# Patient Record
Sex: MALE | Race: WHITE | Employment: OTHER | URBAN - METROPOLITAN AREA
[De-identification: names, ages, dates, MRNs, and addresses within clinical notes are randomized per-mention and may not be internally consistent; named-entity substitution may affect disease eponyms.]

---

## 2020-04-03 ENCOUNTER — NURSE TRIAGE (OUTPATIENT)
Dept: NURSING | Facility: CLINIC | Age: 40
End: 2020-04-03

## 2020-04-03 ENCOUNTER — VIRTUAL VISIT (OUTPATIENT)
Dept: FAMILY MEDICINE | Facility: OTHER | Age: 40
End: 2020-04-03

## 2020-04-03 NOTE — TELEPHONE ENCOUNTER
Patient calling because he needs a note from his provider about when he should return to work.      His symptoms are improving, does not wish to triage symptoms at this time.    Directed patient to [email or web address]    Protocol and care advice reviewed  Caller states understanding of the recommended disposition  Advised to call back with further questions or concerns    Salome Borden RN        Reason for Disposition    General information question, no triage required and triager able to answer question    COVID-19 Home Isolation, questions about    Additional Information    Negative: [1] Caller is not with the adult (patient) AND [2] reporting urgent symptoms    Negative: Lab result questions    Negative: Medication questions    Negative: Caller can't be reached by phone    Negative: Caller has already spoken to PCP or another triager    Negative: RN needs further essential information from caller in order to complete triage    Negative: Requesting regular office appointment    Negative: [1] Caller requesting NON-URGENT health information AND [2] PCP's office is the best resource    Negative: Health Information question, no triage required and triager able to answer question    Negative: SEVERE difficulty breathing (e.g., struggling for each breath, speaks in single words)    Negative: Difficult to awaken or acting confused (e.g., disoriented, slurred speech)    Negative: Bluish (or gray) lips or face now    Negative: Shock suspected (e.g., cold/pale/clammy skin, too weak to stand, low BP, rapid pulse)    Negative: Sounds like a life-threatening emergency to the triager    Negative: [1] COVID-19 suspected (e.g., cough, fever, shortness of breath) AND [2] public health department recommends testing    Negative: [1] COVID-19 exposure AND [2] no symptoms    Negative: COVID-19 and Breastfeeding, questions about    Negative: SEVERE or constant chest pain (Exception: mild central chest pain, present only when coughing)    Negative:  MODERATE difficulty breathing (e.g., speaks in phrases, SOB even at rest, pulse 100-120)    Negative: Patient sounds very sick or weak to the triager    Negative: MILD difficulty breathing (e.g., minimal/no SOB at rest, SOB with walking, pulse <100)    Negative: Chest pain    Negative: Fever > 103 F (39.4 C)    Negative: [1] Fever > 101 F (38.3 C) AND [2] age > 60    Negative: [1] Fever > 100.0 F (37.8 C) AND [2] bedridden (e.g., nursing home patient, CVA, chronic illness, recovering from surgery)    Negative: HIGH RISK patient (e.g., age > 64 years, diabetes, heart or lung disease, weak immune system)    Negative: Fever present > 3 days (72 hours)    Negative: [1] Fever returns after gone for over 24 hours AND [2] symptoms worse or not improved    Negative: [1] Continuous (nonstop) coughing interferes with work or school AND [2] no improvement using cough treatment per protocol    Negative: Cough present > 3 weeks    Negative: 1] COVID-19 infection diagnosed or suspected AND [2] mild symptoms (fever, cough) AND [2] no trouble breathing or other complications    Negative: COVID-19, questions about    [facility] Specific Disposition  - REQUIRED: [facility] Specific Patient Instructions  COVID 19 Nurse Triage Plan/Patient Instructions    Please be aware that novel coronavirus (COVID-19) may be circulating in the community. If you develop symptoms such as fever, cough, or SOB or if you have concerns about the presence of another infection including coronavirus (COVID-19), please contact your health care provider or visit [email or web address].       Additional COVID19 information to add for patients.     Additional General Information About COVID-19    COVID-19 - General Information:  Regardless of if you have been tested or not:  Patient who have symptoms (cough, fever, or shortness of breath), need to isolate for 7 days from when symptoms started AND 72 hours after fever resolves (without fever reducing  medications) AND improvement of respiratory symptoms (whichever is longer).    Isolate yourself at home (in own room/own bathroom if possible)  Do Not allow any visitors  Do Not go to work or school  Do Not go to Y[employer],  centers, shopping, or other public places.  Do Not shake hands.  Avoid close and intimate contact with others (hugging, kissing).  Follow CDC recommendations for household cleaning of frequently touched services.     After the initial 7 days, continue to isolate yourself from household members as much as possible. To continue decrease the risk of community spread and exposure, you and any members of your household should limit activities in public for 14 days after starting home isolation.     You can reference the following CDC link for helpful home isolation/care tips:  https://www.cdc.gov/coronavirus/2019-ncov/downloads/10Things.pdf    COVID-19 - Symptoms:   The COVID-19 can cause a respiratory illness, such as bronchitis or pneumonia.  The most common symptoms are: cough, fever, and shortness of breath.   Other symptoms are: body aches, chills, diarrhea, fatigue, headache, runny nose, and sore throat     COVID-19 - Exposure Risk Factors:  Exposure to a person who has been diagnosed with COVID-19 .  Travel from an area with recent local transmission of COVID-19 .  The CDC (www.cdc.gov) has the most up-to-date list of where the COVID-19 outbreak is occurring.    COVID-19 - Spreading:   The virus likely spreads through respiratory droplets produced when a person coughs or sneezes. These respiratory droplets can travel approximately 6 feet and can remain on surfaces.  Common disinfectants will kill the virus.  The CDC currently does not recommend healthy people wearing masks.    COVID-19 - Protect Yourself:   Avoid close contact with people known to have this new COVID-19 infection.  Wash hands often with soap and water or alcohol-based hand .  Avoid touching the eyes, nose or  mouth.     Thank you for limiting contact with others, wearing a simple mask to cover your cough, practice good hand hygiene habits and accessing our virtual services where possible to limit the spread of this virus.    For more information about COVID19 and options for caring for yourself at home, please visit the CDC website at https://www.cdc.gov/coronavirus/2019-ncov/about/steps-when-sick.html  For more options for care at [facility], please visit our website at [email or web address]    For more information, please use the [facility] ([facility]) COVID-19 Hotlines (Interpreters available):   Health questions: Phone Number: [phone] or [phone] and Hours: 7 a.m. to 7 p.m.  Schools and  questions: Phone Number: [phone] or [phone] and Hours 7 a.m. to 7 p.m.    Protocols used: INFORMATION ONLY CALL-A-AH, CORONAVIRUS (COVID-19) DIAGNOSED OR CADFPKHTE-C-UC 3.30.20

## 2020-04-03 NOTE — PROGRESS NOTES
"Date: 2020 15:58:53  Clinician: Kristen Feliz  Clinician NPI: [id number]  Patient: ANIL CHAU  Patient : 1980  Patient Address: [facility] [address], [place] [address]  Patient Phone: [phone]  Visit Protocol: URI  Patient Summary:  ANIL is a 39 year old ( : 1980 ) male who initiated a Visit for COVID-19 (Coronavirus) evaluation and screening. When asked the question \"Please sign me up to receive news, health information and promotions from [employer].\", ANIL responded \"Yes\".    ANIL states his symptoms started gradually 7-9 days ago.   His symptoms consist of myalgia, chills, a cough, malaise, and rhinitis.   Symptom details     Nasal secretions: The color of his mucus is white and clear.    Cough: ANIL coughs a few times an hour and his cough is not more bothersome at night. Phlegm comes into his throat when he coughs. He does not believe his cough is caused by post-nasal drip. The color of the phlegm is white and clear.      ANIL denies having teeth pain, headache, fever, facial pain or pressure, wheezing, sore throat, nasal congestion, and ear pain. He also denies double sickening (worsening symptoms after initial improvement), taking antibiotic medication for the symptoms, and having recent facial or sinus surgery in the past 60 days. He is not experiencing dyspnea.   Precipitating events  He has not recently been exposed to someone with influenza. ANIL has not been in close contact with any high risk individuals.   Pertinent COVID-19 (Coronavirus) information  ANIL has not traveled internationally or to the areas where COVID-19 (Coronavirus) is widespread, including cruise ship travel in the last 14 days before the start of his symptoms.   ANIL has not had a close contact with a laboratory-confirmed COVID-19 patient within 14 days of symptom onset. He also has not had a close contact with a suspected COVID-19 patient within 14 days of symptom onset.   ANIL is not a healthcare worker or a "  and does not work in a healthcare facility. He does not live with a healthcare worker.   Pertinent medical history  ANIL needs a return to work/school note.   Weight: 200 lbs   ANIL does not smoke or use smokeless tobacco.   Weight: 200 lbs    MEDICATIONS: famotidine-Ca carb-mag hydrox oral, ALLERGIES: pantoprazole, esomeprazole, omeprazole  Clinician Response:  Dear ANIL,   Based on the information you have provided, you do have symptoms that are consistent with Coronavirus (COVID-19).   The coronavirus causes mild to severe respiratory illness with the most common symptoms including fever, cough and difficulty breathing. Unfortunately, many viruses cause similar symptoms and it can be difficult to distinguish between viruses, especially in mild cases, so we are presuming that anyone with cough or fever has coronavirus at this time.  Coronavirus/COVID-19 has reached the point of community spread in [place], meaning that we are finding the virus in people with no known exposure risk for marcelino the virus. Given the increasing commonness of coronavirus in the community we are no longer testing patients who are not critically ill.  If you are a health care worker, you should refer to your employee health office for instructions about testing and returning to work.  For everyone else who has cough or fever, you should assume you are infected with coronavirus. Since you will not be tested but have symptoms that may be consistent with coronavirus, the CDC recommends you stay in self-isolation until these three things have happened:    You have had no fever for at least 72 hours (that is three full days of no fever without the use of medicine that reduces fevers)    AND   Other symptoms have improved (for example, when your cough or shortness of breath have improved)   AND   At least 7 days have passed since your symptoms first appeared.   How to Isolate:    Isolate yourself at home.   Do Not allow  any visitors  Do Not go to work or school  Do Not go to S[employer],  centers, shopping, or other public places.  Do Not shake hands.  Avoid close contact with others (hugging, kissing).   Protect Others:    Cover Your Mouth and Nose with a mask, disposable tissue or wash cloth to avoid spreading germs to others.  Wash your hands and face frequently with soap and water.   Managing Symptoms:    At this time, we primarily recommend Tylenol (Acetaminophen) for fever or pain. If you have liver or kidney problems, contact your primary care provider for instructions on use of tylenol. Adults can take 650 mg (two 325 mg pills) by mouth every 4-6 hours as needed OR 1,000 mg (two 500 mg pills) every 8 hours as needed. MAXIMUM DAILY DOSE: 3,000mg. For children, refer to dosing on bottle based on age or weight.   If you develop significant shortness of breath that prevents you from doing normal activities, please call 911 or proceed to the nearest emergency room and alert them immediately that you have been in self-isolation for possible coronavirus.   If you have a higher risk medical condition such as cancer, heart failure, end stage renal disease on dialysis or have a transplant, please reach out to your specialist's clinic to advise them of your [facility] visit should you not improve within the next two days.  For more information about COVID19 and options for caring for yourself at home, please visit the CDC website at https://www.cdc.gov/coronavirus/2019-ncov/about/steps-when-sick.htmlFor more options for care at [facility], please visit our website at [email or web address]     Diagnosis: Coronavirus infection, unspecified  Diagnosis ICD: B34.2  Prescription: benzonatate 200 mg oral capsule 3 capsule, 0 days supply. Take 1 capsule by mouth 3 times per day as needed for cough. Refills: 0, Refill as needed: no, Allow substitutions: yes  Prescription: fluticasone propionate (Flonase  Allergy Relief) 50 mcg/actuation nasal spray,suspension 1 60 spray aerosol with adapter, 0 days supply. Inhale 2 sprays in each nostril intranasally 1 time per day as needed. Refills: 0, Refill as needed: no, Allow substitutions: yes  Addendum created: September 24 19:40:24, 2020 created by: Gina Reid MD body: Dear Prosper, your [facility] visit was dated April. I cannot unfortunately release you to work based on a visit done in April. Please contact your primary care provider or schedule a virtual appointment with your primary care provider to discuss

## 2020-09-29 ENCOUNTER — VIRTUAL VISIT (OUTPATIENT)
Dept: FAMILY MEDICINE | Facility: OTHER | Age: 40
End: 2020-09-29
Payer: COMMERCIAL

## 2020-09-29 PROCEDURE — 99421 OL DIG E/M SVC 5-10 MIN: CPT | Performed by: FAMILY MEDICINE

## 2020-09-30 NOTE — PROGRESS NOTES
"Date: 2020 20:11:32  Clinician: Leonardo Floyd  Clinician NPI: [id number]  Patient: ANIL CHAU  Patient : 1980  Patient Address: [facility] [address], [place] [address]  Patient Phone: [phone]  Visit Protocol: URI  Patient Summary:  ANIL is a 40 year old ( : 1980 ) male who initiated a [facility] Visit for COVID-19 (Coronavirus) evaluation and screening. When asked the question \"Please sign me up to receive news, health information and promotions from [facility].\", ANIL responded \"No\".    When asked when his symptoms started, ANIL reported that he does not have any symptoms.   He denies taking antibiotic medication in the past month and having recent facial or sinus surgery in the past 60 days.    Pertinent COVID-19 (Coronavirus) information  In the past 14 days, ANIL has not worked in a congregate living setting.   He does not work or volunteer as healthcare worker or a  and does not work or volunteer in a healthcare facility.   ANIL also has not lived in a congregate living setting in the past 14 days. He does not live with a healthcare worker.   ANIL has not had a close contact with a laboratory-confirmed COVID-19 patient within the last 14 days.   Since 2019, ANIL and has not had upper respiratory infection or influenza-like illness. Has not been diagnosed with lab-confirmed COVID-19 test   Pertinent medical history  ANIL needs a return to work/school note.   Weight: 200 lbs   ANIL does not smoke or use smokeless tobacco.   Weight: 200 lbs    MEDICATIONS: famotidine-Ca carb-mag hydrox oral, ALLERGIES: omeprazole, esomeprazole, pantoprazole  Clinician Response:  Dear ANIL,   Based on your exposure to COVID-19 (coronavirus), we would like to test you for this virus.  1. Please call [phone] to schedule your visit. Explain that you were referred by [facility] to have a COVID-19 test. Be ready to share your [facility] visit ID number.  The following will serve as your written " order for this COVID Test, ordered by me, for the indication of suspected COVID [Z20.828]: The test will be ordered in [employer], our electronic health record, after you are scheduled. It will show as ordered and authorized by Quinn Butterfield MD.  Order: COVID-19 (coronavirus) PCR [facility]  If you know you have had close contact with someone who tested positive, you should be quarantined for 14 days after this exposure. You should stay in quarantine for the14 days even if the covid test is negative, the optimal time to test after exposure is 5-7 days from the exposure  Quarantine means   What should I do?  For safety, it's very important to follow these rules. Do this for 14 days after the date you were last exposed to the virus..  Stay home and away from others. Don't go to school or anywhere else. Generally quarantine means staying home from work but there are some exceptions to this. Please contact your workplace.   No hugging, kissing or shaking hands.  Don't let anyone visit.  Cover your mouth and nose with a mask, tissue or washcloth to avoid spreading germs.  Wash your hands and face often. Use soap and water.  What are the symptoms of COVID-19?  The most common symptoms are cough, fever and trouble breathing. Less common symptoms include headache, body aches, fatigue (feeling very tired), chills, sore throat, stuffy or runny nose, diarrhea (loose poop), loss of taste or smell, belly pain, and nausea or vomiting (feeling sick to your stomach or throwing up).  After 14 days, if you have still don't have symptoms, you likely don't have this virus.  If you develop symptoms, follow these guidelines.  If you're normally healthy: Please start another [facility] visit to report your symptoms. Go to [email or web address].  If you have a serious health problem (like cancer, heart failure, an organ transplant or kidney disease): Call your specialty clinic. Let them know that you might have COVID-19.  2.  When it's time for your COVID test:  Stay at least 6 feet away from others. (If someone will drive you to your test, stay in the backseat, as far away from the  as you can.)  Cover your mouth and nose with a mask, tissue or washcloth.  Go straight to the testing site. Don't make any stops on the way there or back.  Please note  Caregivers in these groups are at risk for severe illness due to COVID-19:  o People 65 years and older  o People who live in a nursing home or long-term care facility  o People with chronic disease (lung, heart, cancer, diabetes, kidney, liver, immunologic)  o People who have a weakened immune system, including those who:  Are in cancer treatment  Take medicine that weakens the immune system, such as corticosteroids  Had a bone marrow or organ transplant  Have an immune deficiency  Have poorly controlled HIV or AIDS  Are obese (body mass index of 40 or higher)  Smoke regularly  Where can I get more information?  [facility] -- About COVID-19: [email or web address]  CDC -- What to Do If You're Sick: www.cdc.gov/coronavirus/2019-ncov/about/steps-when-sick.html  CDC -- Ending Home Isolation: www.cdc.gov/coronavirus/2019-ncov/hcp/disposition-in-home-patients.html  [facility] -- Caring for Someone: www.cdc.gov/coronavirus/2019-ncov/if-you-are-sick/care-for-someone.html  [facility] -- Interim Guidance for Hospital Discharge to Home: www.health.[facility].[place]/diseases/coronavirus/hcp/hospdischarge.pdf  [facility] clinical trials (COVID-19 research studies): clinicalaffairs.[facility]-clinical-trials  Below are the COVID-19 hotlines at the [facility] of Health ([facility]). Interpreters are available.  For health questions: Call 630-461-9503 or [phone] (7 a.m. to 7 p.m.)  For questions about schools and childcare: Call 384-216-4509 or [phone] (7 a.m. to 7 p.m.)    Diagnosis: Contact with and (suspected) exposure to other viral communicable diseases  Diagnosis  ICD: Z20.828

## 2020-10-13 DIAGNOSIS — Z20.822 SUSPECTED 2019 NOVEL CORONAVIRUS INFECTION: Primary | ICD-10-CM

## 2020-11-23 ENCOUNTER — APPOINTMENT (OUTPATIENT)
Dept: GENERAL RADIOLOGY | Facility: CLINIC | Age: 40
End: 2020-11-23
Attending: EMERGENCY MEDICINE
Payer: COMMERCIAL

## 2020-11-23 ENCOUNTER — NURSE TRIAGE (OUTPATIENT)
Dept: NURSING | Facility: CLINIC | Age: 40
End: 2020-11-23

## 2020-11-23 ENCOUNTER — HOSPITAL ENCOUNTER (EMERGENCY)
Facility: CLINIC | Age: 40
Discharge: HOME | End: 2020-11-23
Attending: EMERGENCY MEDICINE | Admitting: EMERGENCY MEDICINE
Payer: COMMERCIAL

## 2020-11-23 VITALS
DIASTOLIC BLOOD PRESSURE: 88 MMHG | TEMPERATURE: 98 F | SYSTOLIC BLOOD PRESSURE: 136 MMHG | WEIGHT: 205 LBS | HEART RATE: 82 BPM | BODY MASS INDEX: 26.31 KG/M2 | RESPIRATION RATE: 8 BRPM | HEIGHT: 74 IN | OXYGEN SATURATION: 100 %

## 2020-11-23 DIAGNOSIS — Z87.19 HISTORY OF GASTROESOPHAGEAL REFLUX (GERD): ICD-10-CM

## 2020-11-23 DIAGNOSIS — R07.9 CHEST PAIN, UNSPECIFIED TYPE: ICD-10-CM

## 2020-11-23 LAB
ALBUMIN SERPL-MCNC: 4.1 G/DL (ref 3.4–5)
ALP SERPL-CCNC: 92 U/L (ref 40–150)
ALT SERPL W P-5'-P-CCNC: 28 U/L (ref 0–70)
ANION GAP SERPL CALCULATED.3IONS-SCNC: 5 MMOL/L (ref 3–14)
AST SERPL W P-5'-P-CCNC: 15 U/L (ref 0–45)
BASOPHILS # BLD AUTO: 0 10E9/L (ref 0–0.2)
BASOPHILS NFR BLD AUTO: 0.4 %
BILIRUB SERPL-MCNC: 0.3 MG/DL (ref 0.2–1.3)
BUN SERPL-MCNC: 10 MG/DL (ref 7–30)
CALCIUM SERPL-MCNC: 9.2 MG/DL (ref 8.5–10.1)
CHLORIDE SERPL-SCNC: 104 MMOL/L (ref 94–109)
CO2 SERPL-SCNC: 28 MMOL/L (ref 20–32)
CREAT SERPL-MCNC: 0.97 MG/DL (ref 0.66–1.25)
D DIMER PPP FEU-MCNC: <0.3 UG/ML FEU (ref 0–0.5)
DIFFERENTIAL METHOD BLD: ABNORMAL
EOSINOPHIL # BLD AUTO: 0 10E9/L (ref 0–0.7)
EOSINOPHIL NFR BLD AUTO: 0.3 %
ERYTHROCYTE [DISTWIDTH] IN BLOOD BY AUTOMATED COUNT: 12.1 % (ref 10–15)
GFR SERPL CREATININE-BSD FRML MDRD: >90 ML/MIN/{1.73_M2}
GLUCOSE SERPL-MCNC: 106 MG/DL (ref 70–99)
HCT VFR BLD AUTO: 46.5 % (ref 40–53)
HGB BLD-MCNC: 16.6 G/DL (ref 13.3–17.7)
IMM GRANULOCYTES # BLD: 0 10E9/L (ref 0–0.4)
IMM GRANULOCYTES NFR BLD: 0.3 %
LYMPHOCYTES # BLD AUTO: 1.4 10E9/L (ref 0.8–5.3)
LYMPHOCYTES NFR BLD AUTO: 12.9 %
MCH RBC QN AUTO: 31.4 PG (ref 26.5–33)
MCHC RBC AUTO-ENTMCNC: 35.7 G/DL (ref 31.5–36.5)
MCV RBC AUTO: 88 FL (ref 78–100)
MONOCYTES # BLD AUTO: 0.4 10E9/L (ref 0–1.3)
MONOCYTES NFR BLD AUTO: 4 %
NEUTROPHILS # BLD AUTO: 8.9 10E9/L (ref 1.6–8.3)
NEUTROPHILS NFR BLD AUTO: 82.1 %
NRBC # BLD AUTO: 0 10*3/UL
NRBC BLD AUTO-RTO: 0 /100
PLATELET # BLD AUTO: 228 10E9/L (ref 150–450)
POTASSIUM SERPL-SCNC: 3.6 MMOL/L (ref 3.4–5.3)
PROT SERPL-MCNC: 7.7 G/DL (ref 6.8–8.8)
RBC # BLD AUTO: 5.29 10E12/L (ref 4.4–5.9)
SODIUM SERPL-SCNC: 137 MMOL/L (ref 133–144)
TROPONIN I SERPL-MCNC: <0.015 UG/L (ref 0–0.04)
WBC # BLD AUTO: 10.8 10E9/L (ref 4–11)

## 2020-11-23 PROCEDURE — 93005 ELECTROCARDIOGRAM TRACING: CPT | Performed by: EMERGENCY MEDICINE

## 2020-11-23 PROCEDURE — 93010 ELECTROCARDIOGRAM REPORT: CPT | Performed by: EMERGENCY MEDICINE

## 2020-11-23 PROCEDURE — 99285 EMERGENCY DEPT VISIT HI MDM: CPT | Mod: 25 | Performed by: EMERGENCY MEDICINE

## 2020-11-23 PROCEDURE — 85379 FIBRIN DEGRADATION QUANT: CPT | Performed by: EMERGENCY MEDICINE

## 2020-11-23 PROCEDURE — 71046 X-RAY EXAM CHEST 2 VIEWS: CPT

## 2020-11-23 PROCEDURE — 80053 COMPREHEN METABOLIC PANEL: CPT | Performed by: EMERGENCY MEDICINE

## 2020-11-23 PROCEDURE — 85025 COMPLETE CBC W/AUTO DIFF WBC: CPT | Performed by: EMERGENCY MEDICINE

## 2020-11-23 PROCEDURE — 84484 ASSAY OF TROPONIN QUANT: CPT | Performed by: EMERGENCY MEDICINE

## 2020-11-23 RX ORDER — SUCRALFATE ORAL 1 G/10ML
1 SUSPENSION ORAL 2 TIMES DAILY PRN
Qty: 70 ML | Refills: 0 | Status: SHIPPED | OUTPATIENT
Start: 2020-11-23 | End: 2020-11-30

## 2020-11-23 ASSESSMENT — ENCOUNTER SYMPTOMS
PSYCHIATRIC NEGATIVE: 1
CONSTITUTIONAL NEGATIVE: 1
CHEST TIGHTNESS: 1
NEUROLOGICAL NEGATIVE: 1
GASTROINTESTINAL NEGATIVE: 1
ALLERGIC/IMMUNOLOGIC NEGATIVE: 1
EYES NEGATIVE: 1
HEMATOLOGIC/LYMPHATIC NEGATIVE: 1
MUSCULOSKELETAL NEGATIVE: 1
ENDOCRINE NEGATIVE: 1

## 2020-11-23 ASSESSMENT — MIFFLIN-ST. JEOR: SCORE: 1899.62

## 2020-11-23 NOTE — LETTER
January 9, 2023      Prosper Ponce  [address] [place] [address]        Dear ,    We are writing to inform you of your test results.    H. Pylori testing is negative.    Resulted Orders   Helicobacter pylori Antigen Stool   Result Value Ref Range    Helicobacter pylori Antigen Stool Negative Negative      Comment:      Negative for Helicobacter pylori antigen by enzyme immunoassay. A negative result indicates the absence of H. pylori antigen or that the level of antigen is below the level of detection.       If you have any questions or concerns, please call the clinic at the number listed above.       Sincerely,      Mai Huerta NP

## 2020-11-23 NOTE — ED AVS SNAPSHOT
[facility] Emergency Dept  [address] [address]  Phone: [phone]  Fax: [phone]                                    Prosper Ponce   MRN: [id number]    Department: [facility] Emergency Dept   Date of Visit: 11/23/2020           After Visit Summary Signature Page    I have received my discharge instructions, and my questions have been answered. I have discussed any challenges I see with this plan with the nurse or doctor.    ..........................................................................................................................................  Patient/Patient Representative Signature      ..........................................................................................................................................  Patient Representative Print Name and Relationship to Patient    ..................................................               ................................................  Date                                   Time    ..........................................................................................................................................  Reviewed by Signature/Title    ...................................................              ..............................................  Date                                               Time          22EPIC Rev 08/18

## 2020-11-23 NOTE — TELEPHONE ENCOUNTER
Pt reports he developed chest tightness and L arm pain on Friday.  The pain seems to be less intense now but still present.  He states a dentist once told him he had high BP.  He denies other heart history diagnosis.      Disposition:  ED now.  He verbalized understanding and had no further questions.     COVID 19 Nurse Triage Plan/Patient Instructions    Please be aware that novel coronavirus (COVID-19) may be circulating in the community. If you develop symptoms such as fever, cough, or SOB or if you have concerns about the presence of another infection including coronavirus (COVID-19), please contact your health care provider or visit [email or web address].     Disposition/Instructions    ED Visit recommended. Follow protocol based instructions.     Bring Your Own Device:  Please also bring your smart device(s) (smart phones, tablets, laptops) and their charging cables for your personal use and to communicate with your care team during your visit.    Thank you for taking steps to prevent the spread of this virus.  o Limit your contact with others.  o Wear a simple mask to cover your cough.  o Wash your hands well and often.    Resources    [facility]: About COVID-19: [email or web address]    CDC: What to Do If You're Sick: www.cdc.gov/coronavirus/2019-ncov/about/steps-when-sick.html    CDC: Ending Home Isolation: www.cdc.gov/coronavirus/2019-ncov/hcp/disposition-in-home-patients.html     CDC: Caring for Someone: www.cdc.gov/coronavirus/2019-ncov/if-you-are-sick/care-for-someone.html     [facility]: Interim Guidance for Hospital Discharge to Home: www.health.[email or web address]/diseases/coronavirus/hcp/hospdischarge.pdf    [facility] clinical trials (COVID-19 research studies): [email or web address]     Below are the COVID-19 hotlines at the [facility] ([facility]). Interpreters are available.   o For health questions: Call 697-419-1395 or 1-985.533.8252 (7 a.m. to 7  "p.m.)  o For questions about schools and childcare: Call 355-959-1061 or 1-275.268.1849 (7 a.m. to 7 p.m.)         Blanche Blum RN/ANGELLA      Reason for Disposition    [1] Age > 40 AND [2] associated chest or jaw pain AND [3] pain lasts > 5 minutes    Additional Information    Negative: Severe difficulty breathing (e.g., struggling for each breath, speaks in single words)    Negative: Difficult to awaken or acting confused (e.g., disoriented, slurred speech)    Negative: Shock suspected (e.g., cold/pale/clammy skin, too weak to stand, low BP, rapid pulse)    Negative: [1] Chest pain lasts > 5 minutes AND [2] history of heart disease  (i.e., heart attack, bypass surgery, angina, angioplasty, CHF; not just a heart murmur)    Negative: [1] Chest pain lasts > 5 minutes AND [2] described as crushing, pressure-like, or heavy    Negative: [1] Chest pain lasts > 5 minutes AND [2] age > 50    Negative: [1] Chest pain lasts > 5 minutes AND [2] age > 30 AND [3] at least one cardiac risk factor (i.e., hypertension, diabetes, obesity, smoker or strong family history of heart disease)    Negative: [1] Chest pain lasts > 5 minutes AND [2] not relieved with nitroglycerin    Negative: Passed out (i.e., lost consciousness, collapsed and was not responding)    Negative: Heart beating < 50 beats per minute OR > 140 beats per minute    Negative: Visible sweat on face or sweat dripping down face    Negative: Sounds like a life-threatening emergency to the triager    Negative: SEVERE chest pain    Negative: [1] Intermittent  chest pain or \"angina\" AND [2] increasing in severity or frequency  (Exception: pains lasting a few seconds)    Negative: Shock suspected (e.g., cold/pale/clammy skin, too weak to stand, low BP, rapid pulse)    Negative: [1] Similar pain previously AND [2] it was from \"heart attack\"    Negative: [1] Similar pain previously AND [2] it was from \"angina\" AND [3] not relieved by nitroglycerin    Negative: Sounds like a " life-threatening emergency to the triager    Negative: Difficulty breathing or unusual sweating (e.g., sweating without exertion)    Protocols used: ARM PAIN-A-AH, CHEST PAIN-A-AH

## 2020-11-24 NOTE — ED NOTES
Pt c/o left upper chest pain radiating into shoulder and down arm since Thursday. Pt states pain has generally gotten better since then but not gone, chest pain has lessened in severity, still present and still waxes/wanes. Pt taking PPI thinking it was heartburn, no change in s/s. Pt denies SOB, had mild nausea none now.

## 2020-11-24 NOTE — ED PROVIDER NOTES
History     Chief Complaint   Patient presents with     Chest Pain     cp and lt arm pain     HPI  Prosper Ponce is a 40 year old male who presents with report of chest pain and left arm pain.  Patient arrived by car alone from home in [facility] reporting  intermittent left-sided chest discomfort and left arm pain over the last few days.  Patient reports a history of GERD-currently on famotidine. Patient reports no personal history of heart disease. Patient reports paternal relative who  of myocardial infarction in his 60s.  Patient reports he does not smoke.  He reports no personal history of DVT or PE.  No fever or chills.  Patient lives alone at home and reports no contact with anyone with COVID-19 symptoms.  No lower extremity swelling.  No calf pain. Patient  reports no dyspnea on exertion.  Patient reports  he noted a plaque-like lesion about the left hip.  He reports a personal history of psoriasis and family history of psoriasis.  Lesion about the left hip is similar to prior lesions he has had in the past with flare of dry skin, and psoriasis. 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  No palpitations.  Patient was not certain if his pain was related to acid reflux and wanted to come in because he had discomfort about the left chest and left arm and wanted to be evaluated.    Allergies:  No Known Allergies    Problem List:    There are no active problems to display for this patient.       Past Medical History:    No past medical history on file.    Past Surgical History:    No past surgical history on file.    Family History:    No family history on file.    Social History:  Marital Status:  Single [1]  Social History     Tobacco Use     Smoking status: Not on file   Substance Use Topics     Alcohol use: Not on file     Drug use: Not on file        Medications:         sucralfate (CARAFATE) 1 GM/10ML suspension          Review of Systems   Constitutional: Negative.   "  HENT: Negative.    Eyes: Negative.    Respiratory: Positive for chest tightness.    Cardiovascular: Positive for chest pain.   Gastrointestinal: Negative.    Endocrine: Negative.    Genitourinary: Negative.    Musculoskeletal: Negative.    Skin: Negative.    Allergic/Immunologic: Negative.    Neurological: Negative.    Hematological: Negative.    Psychiatric/Behavioral: Negative.    All other systems reviewed and are negative.      Physical Exam   BP: 125/89  Pulse: 100  Temp: 98  F (36.7  C)  Resp: 18  Height: 188 cm (6' 2\")  Weight: 93 kg (205 lb)  SpO2: 95 %      Physical Exam  Constitutional:       General: He is not in acute distress.     Appearance: He is not ill-appearing, toxic-appearing or diaphoretic.   HENT:      Head: Normocephalic and atraumatic.   Eyes:      Extraocular Movements: Extraocular movements intact.      Pupils: Pupils are equal, round, and reactive to light.   Neck:      Musculoskeletal: Normal range of motion and neck supple.      Thyroid: No thyromegaly.      Vascular: No hepatojugular reflux or JVD.      Trachea: No tracheal deviation.   Cardiovascular:      Rate and Rhythm: Normal rate and regular rhythm.      Heart sounds: Normal heart sounds.   Pulmonary:      Effort: Pulmonary effort is normal. No tachypnea, accessory muscle usage or respiratory distress.      Breath sounds: Normal breath sounds.   Chest:      Chest wall: No mass, deformity, tenderness, crepitus or edema. There is no dullness to percussion.   Abdominal:      General: There is no abdominal bruit.      Palpations: There is no hepatomegaly, splenomegaly or mass.      Tenderness: There is no abdominal tenderness. There is no guarding or rebound.   Musculoskeletal:      Right lower leg: He exhibits no tenderness. No edema.      Left lower leg: He exhibits no tenderness. No edema.   Lymphadenopathy:      Cervical: No cervical adenopathy.   Skin:     Capillary Refill: Capillary refill takes less than 2 seconds.      " Coloration: Skin is not cyanotic or pale.      Findings: No ecchymosis, erythema or rash.      Nails: There is no clubbing.     Neurological:      General: No focal deficit present.      Mental Status: He is alert and oriented to person, place, and time.      Cranial Nerves: No cranial nerve deficit.      Motor: No weakness.   Psychiatric:         Mood and Affect: Mood normal. Mood is not anxious.         Behavior: Behavior normal. Behavior is not agitated.         ED Course        Procedures               EKG Interpretation:      Interpreted by Bogdan Mcneil MD  Time reviewed: 2000  Symptoms at time of EKG: None   Rhythm: normal sinus   Rate: Normal  Axis: Normal  Ectopy: none  Conduction: normal  ST Segments/ T Waves: Non-specific ST-T wave changes  Q Waves: nonspecific  Comparison to prior: Unchanged from November 23, 2020    Clinical Impression: no acute changes          Critical Care time:  none               ED medications: none      ED vitals:  Vitals:    11/23/20 1842 11/23/20 1900 11/23/20 1930 11/23/20 2000   BP: (!) 142/92 138/87 133/85 136/88   Pulse: 76 77 78 82   Resp: 18 20 13 8   Temp:       TempSrc:       SpO2: 99% 98% 97% 100%   Weight:       Height:             ED labs and imaging:  Results for orders placed or performed during the hospital encounter of 11/23/20   Chest XR,  PA & LAT     Status: None    Narrative    CHEST TWO VIEWS  11/23/2020 8:20 PM     HISTORY:  Chest pain. Left arm pain.    COMPARISON: 10/14/2010.      Impression    IMPRESSION: Negative chest. Lungs clear.    BRUNILDA MALLOY MD   CBC with platelets differential     Status: Abnormal   Result Value Ref Range    WBC 10.8 4.0 - 11.0 10e9/L    RBC Count 5.29 4.4 - 5.9 10e12/L    Hemoglobin 16.6 13.3 - 17.7 g/dL    Hematocrit 46.5 40.0 - 53.0 %    MCV 88 78 - 100 fl    MCH 31.4 26.5 - 33.0 pg    MCHC 35.7 31.5 - 36.5 g/dL    RDW 12.1 10.0 - 15.0 %    Platelet Count 228 150 - 450 10e9/L    Diff Method Automated Method     %  Neutrophils 82.1 %    % Lymphocytes 12.9 %    % Monocytes 4.0 %    % Eosinophils 0.3 %    % Basophils 0.4 %    % Immature Granulocytes 0.3 %    Nucleated RBCs 0 0 /100    Absolute Neutrophil 8.9 (H) 1.6 - 8.3 10e9/L    Absolute Lymphocytes 1.4 0.8 - 5.3 10e9/L    Absolute Monocytes 0.4 0.0 - 1.3 10e9/L    Absolute Eosinophils 0.0 0.0 - 0.7 10e9/L    Absolute Basophils 0.0 0.0 - 0.2 10e9/L    Abs Immature Granulocytes 0.0 0 - 0.4 10e9/L    Absolute Nucleated RBC 0.0    Comprehensive metabolic panel     Status: Abnormal   Result Value Ref Range    Sodium 137 133 - 144 mmol/L    Potassium 3.6 3.4 - 5.3 mmol/L    Chloride 104 94 - 109 mmol/L    Carbon Dioxide 28 20 - 32 mmol/L    Anion Gap 5 3 - 14 mmol/L    Glucose 106 (H) 70 - 99 mg/dL    Urea Nitrogen 10 7 - 30 mg/dL    Creatinine 0.97 0.66 - 1.25 mg/dL    GFR Estimate >90 >60 mL/min/[1.73_m2]    GFR Estimate If Black >90 >60 mL/min/[1.73_m2]    Calcium 9.2 8.5 - 10.1 mg/dL    Bilirubin Total 0.3 0.2 - 1.3 mg/dL    Albumin 4.1 3.4 - 5.0 g/dL    Protein Total 7.7 6.8 - 8.8 g/dL    Alkaline Phosphatase 92 40 - 150 U/L    ALT 28 0 - 70 U/L    AST 15 0 - 45 U/L   Troponin I     Status: None   Result Value Ref Range    Troponin I ES <0.015 0.000 - 0.045 ug/L   D dimer quantitative     Status: None   Result Value Ref Range    D Dimer <0.3 0.0 - 0.50 ug/ml FEU           Assessments & Plan (with Medical Decision Making)   Assessment Summary and Clinical Impression: 40-year-old male who presented with report of chest pain and left arm pain.  Patient reported pain has been intermittent. Patient was concerned pain was related to acid reflux, not improved with taking famotidine daily and trial of Tums with prior use of Prilosec.  He reports he had an upper endoscopy about 5 years ago that was unremarkable-records not viewable during ED course.  Patient reports he was concerned that his pain was radiating to his left arm and wanted to come in to be evaluated.  He remained  hemodynamically stable during his ED course and was otherwise symptom-free and expressed comfort going home with plan to trial Carafate for symptom management in addition to famotidine and close outpatient follow-up.  Patient is encouraged to establish primary care for reevaluation of his symptoms within 1 week and then to follow-up upper endoscopy may be warranted to exclude any esophagitis versus other process.    ED course and Plan:  Reviewed the medical record.  EKG on arrival today shows no acute ischemic change from comparison from October 14, 2020.  A broad differential was considered.  Reviewed possible causes of symptoms including but not limited to  acute coronary syndrome, GERD, pneumothorax, pulmonary embolism, esophagitis, gastritis.  Patient remained hemodynamic stable during his ED course.  His work-up was reassuring including negative troponin and negative D-dimer.  Chest imaging was reviewed independently and the radiologist  interpretation was also reviewed.  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  We discussed and reviewed reasons to return to the department to be reevaluated including but not limited to palpitation, progressive symptoms despite therapeutic measures reviewed.  We discussed it was prudent for him to establish primary care to help with possible referral to surgery clinical gastroenterology if follow-up endoscopy would be medically warranted depending on his symptom evolution. Patient expressed comfort, understanding and agreement with plan of care.      Disclaimer: This note consists of symbols derived from keyboarding, dictation and/or voice recognition software. As a result, there may be errors in the script that have gone undetected. Please consider this when interpreting information found in this chart.  I have reviewed the  nursing notes.    I have reviewed the findings, diagnosis, plan and need for follow up with the patient.       Discharge Medication List as of 11/23/2020  8:56 PM      START taking these medications    Details   sucralfate (CARAFATE) 1 GM/10ML suspension Take 10 mLs (1 g) by mouth 2 times daily as needed (GERD), Disp-70 mL, R-0, Local Print             Final diagnoses:   Chest pain, unspecified type - Uncertain cause,   History of gastroesophageal reflux (GERD)       11/23/2020   [facility] EMERGENCY DEPT     Bogdan Mcneil MD  11/24/20 0139

## 2020-11-24 NOTE — DISCHARGE INSTRUCTIONS
1) Your evaluation today does not suggest an emergency diagnosis.  The cause of your chest discomfort and left arm discomfort as described and reported is not clear.  We have agreed that you are stable for discharge to home with work-up not reveal any acute process.    2) For symptoms of reflux we discussed ongoing use of famotidine 40 mg/day and consideration of use of Carafate-as prescribed.    3) we discussed a follow-up upper endoscopy may be necessary symptoms persist to exclude esophagitis and/or ulcer.  May call the surgery clinic to schedule follow-up visit however referral may be necessary.  We have discussed the importance of calling the primary care clinic to both establish primary care and to help with referral if necessary for additional diagnostic testing.    4) although you appear stable for discharge to home at this time if your symptoms worsen including but not limited to chest pain and shortness of breath, fainting, fever, cough or new concerns you should return immediately to be reevaluated

## 2020-11-30 ENCOUNTER — OFFICE VISIT (OUTPATIENT)
Dept: FAMILY MEDICINE | Facility: CLINIC | Age: 40
End: 2020-11-30
Payer: COMMERCIAL

## 2020-11-30 VITALS
HEART RATE: 86 BPM | RESPIRATION RATE: 12 BRPM | SYSTOLIC BLOOD PRESSURE: 112 MMHG | TEMPERATURE: 97.7 F | DIASTOLIC BLOOD PRESSURE: 78 MMHG | BODY MASS INDEX: 24.37 KG/M2 | WEIGHT: 189.8 LBS | OXYGEN SATURATION: 97 %

## 2020-11-30 DIAGNOSIS — L20.9 ATOPIC DERMATITIS, UNSPECIFIED TYPE: ICD-10-CM

## 2020-11-30 DIAGNOSIS — K21.9 GASTROESOPHAGEAL REFLUX DISEASE, UNSPECIFIED WHETHER ESOPHAGITIS PRESENT: Primary | ICD-10-CM

## 2020-11-30 PROBLEM — L85.3 DRY SKIN: Status: RESOLVED | Noted: 2020-11-30 | Resolved: 2020-11-30

## 2020-11-30 PROBLEM — L85.3 DRY SKIN: Status: ACTIVE | Noted: 2020-11-30

## 2020-11-30 PROCEDURE — 99203 OFFICE O/P NEW LOW 30 MIN: CPT | Performed by: NURSE PRACTITIONER

## 2020-11-30 RX ORDER — SUCRALFATE ORAL 1 G/10ML
1 SUSPENSION ORAL 4 TIMES DAILY
Qty: 420 ML | Refills: 3 | Status: SHIPPED | OUTPATIENT
Start: 2020-11-30 | End: 2022-12-23

## 2020-11-30 NOTE — NURSING NOTE
"Initial /78   Pulse 86   Temp 97.7  F (36.5  C) (Tympanic)   Resp 12   Wt 86.1 kg (189 lb 12.8 oz)   SpO2 97%   BMI 24.37 kg/m   Estimated body mass index is 24.37 kg/m  as calculated from the following:    Height as of 11/23/20: 1.88 m (6' 2\").    Weight as of this encounter: 86.1 kg (189 lb 12.8 oz). .      "

## 2020-11-30 NOTE — PROGRESS NOTES
"Subjective     Prosper Ponce is a 40 year old male who presents to clinic today for the following health issues:    HPI         ED/UC Followup:    Facility:  [facility] ER  Date of visit: 11/23/20  Reason for visit: Chest pain  Current Status: States that he started taking Nexium daily, states that med started some lower abdominal pain for the first day, no issues since. Has also started taking BGL/liquiorice/slippery elm/ for ulcer type symptoms. States that left shoulder /chest/arm pain has dissipated. States some occasional left lower rib pain continues, but severity has decreased. Has been told that he has GERD a while ago, after endoscopy. Heart burn is a common problem for him. Famotidine and carafate was not helpful for symptoms. Wondering if he has H-pilori.     \"Assessment Summary and Clinical Impression: 40-year-old male who presented with report of chest pain and left arm pain.  Patient reported pain has been intermittent. Patient was concerned pain was related to acid reflux, not improved with taking famotidine daily and trial of Tums with prior use of Prilosec.  He reports he had an upper endoscopy about 5 years ago that was unremarkable-records not viewable during ED course.  Patient reports he was concerned that his pain was radiating to his left arm and wanted to come in to be evaluated.  He remained hemodynamically stable during his ED course and was otherwise symptom-free and expressed comfort going home with plan to trial Carafate for symptom management in addition to famotidine and close outpatient follow-up.  Patient is encouraged to establish primary care for reevaluation of his symptoms within 1 week and then to follow-up upper endoscopy may be warranted to exclude any esophagitis versus other process.     ED course and Plan:  Reviewed the medical record.  EKG on arrival today shows no acute ischemic change from comparison from October 14, 2020.  A broad differential was " "considered.  Reviewed possible causes of symptoms including but not limited to  acute coronary syndrome, GERD, pneumothorax, pulmonary embolism, esophagitis, gastritis.  Patient remained hemodynamic stable during his ED course.  His work-up was reassuring including negative troponin and negative D-dimer.  Chest imaging was reviewed independently and the radiologist  interpretation was also reviewed.  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  We discussed and reviewed reasons to return to the department to be reevaluated including but not limited to palpitation, progressive symptoms despite therapeutic measures reviewed.  We discussed it was prudent for him to establish primary care to help with possible referral to surgery clinical gastroenterology if follow-up endoscopy would be medically warranted depending on his symptom evolution. Patient expressed comfort, understanding and agreement with plan of care.\"      Additional provider notes: Arm and upper chest pain is improved. Left rib cage pain improved since starting Nexium. Hx of using Protonix, pepcid, omeprazole. Now taking Nexium with full resolution of sternal pain. Hx hiatal hernia. Had endoscopy ~6 years ago. Drinks a lot of caffeine, has decreased that over the past week.     Rash: dry patch to skin    Review of Systems   Constitutional: Negative.    Cardiovascular: Negative.    Gastrointestinal: Positive for heartburn. Negative for abdominal pain, diarrhea, nausea and vomiting.   Skin: Positive for rash.            Objective    /78   Pulse 86   Temp 97.7  F (36.5  C) (Tympanic)   Resp 12   Wt 86.1 kg (189 lb 12.8 oz)   SpO2 97%   BMI 24.37 kg/m    Body mass index is 24.37 kg/m .  Physical Exam  Vitals signs and nursing note reviewed.   Constitutional:       General: He is not in acute distress.    " " Appearance: Normal appearance. He is not ill-appearing or toxic-appearing.   Cardiovascular:      Rate and Rhythm: Normal rate and regular rhythm.      Pulses: Normal pulses.      Heart sounds: Normal heart sounds.   Pulmonary:      Effort: Pulmonary effort is normal.      Breath sounds: Normal breath sounds.   Skin:     General: Skin is warm and dry.      Comments: Dry patch of skin consistent with eczema   Neurological:      Mental Status: He is alert and oriented to person, place, and time.   Psychiatric:         Behavior: Behavior normal.                    Assessment & Plan     Gastroesophageal reflux disease, unspecified whether esophagitis present  Possible ulcer. Continue Nexium. Start carafate. Side effects, risks and benefits of medication were discussed with patient. Discussed how and when to take medication. Patient would like to avoid another endoscopy at this time. Patient requesting to try medication first, then will call to schedule if symptoms not improve.     Also recommended patient make appt to establish care.     - sucralfate (CARAFATE) 1 GM/10ML suspension; Take 10 mLs (1 g) by mouth 4 times daily    Atopic dermatitis, unspecified type  Recommended Aquaphor. Follow-up if symptoms do not improve.        BMI:   Estimated body mass index is 24.37 kg/m  as calculated from the following:    Height as of 11/23/20: 1.88 m (6' 2\").    Weight as of this encounter: 86.1 kg (189 lb 12.8 oz).            Patient Instructions   Continue Nexium daily.     Restart Carafate. Follow instructions. Take 4-8 weeks.     Follow-up if symptoms continue as you will likely need endoscopy.     Make appointment to establish care with a provider.     Patient Education     Tips to Control Acid Reflux    To control acid reflux, you ll need to make some basic diet and lifestyle changes. The simple steps outlined below may be all you ll need to ease discomfort.   Watch what you eat    Don't have fatty foods or spicy " foods.    Eat fewer acidic foods, such as citrus and tomato-based foods. These can increase symptoms.    Limit drinking alcohol, caffeine, and fizzy beverages. All increase acid reflux.    Try limiting chocolate, peppermint, and spearmint. These can make acid reflux worse in some people.    Watch when you eat    Don't lie down for 3 hours after eating.    Don't snack before going to bed.    Raise your head  Raising your head and upper body by 4 to 6 inches helps limit reflux when you re lying down. Put blocks under the head of your bed frame or a wedge under your mattress to raise it.   Other changes    Lose weight, if you need to    Don t exercise near bedtime    Don't wear tight-fitting clothes    Limit aspirin and ibuprofen    Stop smoking    [employer] last reviewed this educational content on 6/1/2019 2000-2020 [employer] [address] [place] [address], [place] [address]. All rights reserved. This information is not intended as a substitute for professional medical care. Always follow your healthcare professional's instructions.               Return if symptoms worsen or fail to improve.    ARSH Wong [facility]

## 2020-11-30 NOTE — PATIENT INSTRUCTIONS
Continue Nexium daily.     Restart Carafate. Follow instructions. Take 4-8 weeks.     Follow-up if symptoms continue as you will likely need endoscopy.     Make appointment to establish care with a provider.     Patient Education     Tips to Control Acid Reflux    To control acid reflux, you ll need to make some basic diet and lifestyle changes. The simple steps outlined below may be all you ll need to ease discomfort.   Watch what you eat    Don't have fatty foods or spicy foods.    Eat fewer acidic foods, such as citrus and tomato-based foods. These can increase symptoms.    Limit drinking alcohol, caffeine, and fizzy beverages. All increase acid reflux.    Try limiting chocolate, peppermint, and spearmint. These can make acid reflux worse in some people.    Watch when you eat    Don't lie down for 3 hours after eating.    Don't snack before going to bed.    Raise your head  Raising your head and upper body by 4 to 6 inches helps limit reflux when you re lying down. Put blocks under the head of your bed frame or a wedge under your mattress to raise it.   Other changes    Lose weight, if you need to    Don t exercise near bedtime    Don't wear tight-fitting clothes    Limit aspirin and ibuprofen    Stop smoking    [employer] last reviewed this educational content on 6/1/2019 2000-2020 [employer]. [address] [place] [address], [place] [address]. All rights reserved. This information is not intended as a substitute for professional medical care. Always follow your healthcare professional's instructions.

## 2020-12-01 ASSESSMENT — ENCOUNTER SYMPTOMS
NAUSEA: 0
ABDOMINAL PAIN: 0
VOMITING: 0
DIARRHEA: 0
HEARTBURN: 1
CONSTITUTIONAL NEGATIVE: 1
CARDIOVASCULAR NEGATIVE: 1

## 2021-01-15 ENCOUNTER — HEALTH MAINTENANCE LETTER (OUTPATIENT)
Age: 41
End: 2021-01-15

## 2021-01-24 ENCOUNTER — NURSE TRIAGE (OUTPATIENT)
Dept: NURSING | Facility: CLINIC | Age: 41
End: 2021-01-24

## 2021-01-24 NOTE — TELEPHONE ENCOUNTER
"Left sided pain (above bottom rib - mostly in front) - 50 days ago was seen in ED for LUE/shoulder pain - was diagnosed with heartburn. Is taking Nexium and Carafate occasionally. Rates pain 2-3/10.     Per protocol advised UC evaluation - patient declines - wants to be seen in clinic. Warm transferred to scheduling line.      Carmen Boone RN on 1/24/2021 at 5:00 PM    Additional Information    Negative: Shock suspected (e.g., cold/pale/clammy skin, too weak to stand, low BP, rapid pulse)    Negative: Difficult to awaken or acting confused (e.g., disoriented, slurred speech)    Negative: Passed out (i.e., lost consciousness, collapsed and was not responding)    Negative: Sounds like a life-threatening emergency to the triager    Pain is mainly in upper abdomen  (if needed ask: \"is it mainly above the belly button?\")    Negative: Severe difficulty breathing (e.g., struggling for each breath, speaks in single words)    Negative: Shock suspected (e.g., cold/pale/clammy skin, too weak to stand, low BP, rapid pulse)    Negative: Difficult to awaken or acting confused (e.g., disoriented, slurred speech)    Negative: Passed out (i.e., lost consciousness, collapsed and was not responding)    Negative: Visible sweat on face or sweat dripping down face    Negative: Sounds like a life-threatening emergency to the triager    Negative: Followed an abdomen (stomach) injury    Negative: Chest pain    Negative: [1] SEVERE pain (e.g., excruciating) AND [2] present > 1 hour    Negative: [1] Pain lasts > 10 minutes AND [2] age > 50    Negative: [1] Pain lasts > 10 minutes AND [2] age > 40 AND [3] associated chest, arm, neck, upper back or jaw pain    Negative: [1] Pain lasts > 10 minutes AND [2] age > 35 AND [3] at least one cardiac risk factor (i.e., hypertension, diabetes, obesity, smoker or strong family history of heart disease)    Negative: [1] Pain lasts > 10 minutes AND [2] history of heart disease (i.e., heart attack, bypass " "surgery, angina, angioplasty, CHF; not just a heart murmur)    Negative: [1] Pain lasts > 10 minutes AND [2] difficulty breathing    Negative: [1] Vomiting AND [2] contains red blood  (Exception: few streaks and only occurred once)    Negative: [1] Vomiting AND [2] contains black (\"coffee ground\") material    Negative: Blood in bowel movements  (Exception: Blood on surface of BM with constipation)    Negative: Black or tarry bowel movements  (Exception: chronic-unchanged  black-grey bowel movements AND is taking iron pills or Pepto-bismol)    Negative: [1] Pregnant > 24 weeks AND [2] hand or face swelling    Negative: Patient sounds very sick or weak to the triager    [1] MILD-MODERATE pain AND [2] constant AND [3] present > 2 hours    Protocols used: ABDOMINAL PAIN - MALE-A-AH, ABDOMINAL PAIN - UPPER-A-AH      "

## 2021-01-25 ENCOUNTER — OFFICE VISIT (OUTPATIENT)
Dept: FAMILY MEDICINE | Facility: CLINIC | Age: 41
End: 2021-01-25
Payer: COMMERCIAL

## 2021-01-25 VITALS
HEIGHT: 73 IN | BODY MASS INDEX: 25.05 KG/M2 | WEIGHT: 189 LBS | SYSTOLIC BLOOD PRESSURE: 110 MMHG | OXYGEN SATURATION: 97 % | TEMPERATURE: 96.1 F | HEART RATE: 77 BPM | DIASTOLIC BLOOD PRESSURE: 80 MMHG

## 2021-01-25 DIAGNOSIS — R19.8 CHANGE IN BOWEL MOVEMENT: ICD-10-CM

## 2021-01-25 DIAGNOSIS — R10.12 LUQ ABDOMINAL PAIN: Primary | ICD-10-CM

## 2021-01-25 PROCEDURE — 99214 OFFICE O/P EST MOD 30 MIN: CPT | Performed by: FAMILY MEDICINE

## 2021-01-25 ASSESSMENT — MIFFLIN-ST. JEOR: SCORE: 1807.21

## 2021-01-25 NOTE — PATIENT INSTRUCTIONS
Schedule upper endoscopy and colonoscopy.  Further recommendation to be given then.    Continue esomeprazole for now  Avoid possible triggers for heartburn and abdominal symptoms.      Patient Education     Abdominal Pain  Abdominal pain is pain in the stomach or belly area. Everyone has this pain from time to time. In many cases it goes away on its own. But abdominal pain can sometimes be due to a serious problem, such as appendicitis. So it s important to know when to get help.     Causes of abdominal pain   There are many possible causes of abdominal pain. Common causes in adults include:    Constipation, diarrhea, or gas    Stomach acid flowing back up into the esophagus (acid reflux or heartburn)    Severe acid reflux, called GERD (gastroesophageal reflux disease)    A sore in the lining of the stomach or small intestine (peptic ulcer)    Inflammation of the gallbladder, liver, or pancreas    Gallstones or kidney stones    Appendicitis     Intestinal blockage     An internal organ pushing through a muscle or other tissue (hernia)    Urinary tract infections    In women, menstrual cramps, fibroids, ovarian cysts, pelvic inflammatory disease, or endometriosis    Inflammation or infection of the intestines, including Crohn's disease and ulcerative colitis    Irritable bowel syndrome  Diagnosing the cause of abdominal pain   Your healthcare provider will give you a physical exam help find the cause of your pain. If needed, you will have tests. Belly pain has many possible causes. So it can be hard to find the reason for your pain. Giving details about your pain can help. Tell your provider where and when you feel the pain, and what makes it better or worse. Also let your provider know if you have other symptoms such as:     Fever    Tiredness    Upset stomach (nausea)    Vomiting    Changes in bathroom habits    Blood in the stool or black, tarry stool    Weight loss that you can't explain (involuntary weight  loss?)  Also report any family history of stomach or intestinal problems, or cancers. Tell your provider about all your alcohol use and drug use. Tell your provider about all medicines you use, including herbs, vitamins, and supplements.   Treating abdominal pain   Some causes of pain need emergency medical treatment right away. These include appendicitis or a bowel blockage. Other problems can be treated with rest, fluids, or medicines. Your healthcare provider can give you specific instructions for treatment or self-care based on what is causing your pain.      If you have vomiting or diarrhea, sip water or other clear fluids. When you are ready to eat solid foods again, start with small amounts of easy-to-digest, low-fat foods. These include apple sauce, toast, or crackers.   When to get medical care   Call 911 or go to the hospital right away if you:     Can t pass stool and are vomiting    Are vomiting blood or have bloody diarrhea or black, tarry diarrhea    Have chest, neck, or shoulder pain    Feel like you might pass out    Have pain in your shoulder blades with nausea    Have sudden, severe belly pain    Have new, severe pain unlike any you have felt before    Have a belly that is rigid, hard, and hurts to touch  Call your healthcare provider if you have:     Pain for more than 5 days    Bloating for more than 2 days    Diarrhea for more than 5 days    A fever of 100.4 F (38 C) or higher, or as directed by your healthcare provider    Pain that gets worse    Weight loss for no reason    Continued lack of appetite    Blood in your stool  How to prevent abdominal pain   Here are some tips to help prevent abdominal pain:     Eat smaller amounts of food at each meal.    Don't eat greasy, fried, or other high-fat foods.    Don't eat foods that give you gas.    Exercise regularly.    Drink plenty of fluids.  To help prevent GERD symptoms:     Quit smoking.    Reduce alcohol and foods that increase stomach  acid.    Don't use aspirin or over-the-counter pain and fever medicines, if possible. This includes nonsteroidal anti-inflammatory drugs (NSAIDs).    Lose excess weight.    Finish eating at least 2 hours before you go to bed or lie down.    Raise the head of your bed.  [employer] last reviewed this educational content on 4/1/2019 2000-2020 [employer]. [address] [place] [address]. All rights reserved. This information is not intended as a substitute for professional medical care. Always follow your healthcare professional's instructions.           Patient Education     Tips to Control Acid Reflux    To control acid reflux, you ll need to make some basic diet and lifestyle changes. The simple steps outlined below may be all you ll need to ease discomfort.   Watch what you eat    Don't have fatty foods or spicy foods.    Eat fewer acidic foods, such as citrus and tomato-based foods. These can increase symptoms.    Limit drinking alcohol, caffeine, and fizzy beverages. All increase acid reflux.    Try limiting chocolate, peppermint, and spearmint. These can make acid reflux worse in some people.    Watch when you eat    Don't lie down for 3 hours after eating.    Don't snack before going to bed.    Raise your head  Raising your head and upper body by 4 to 6 inches helps limit reflux when you re lying down. Put blocks under the head of your bed frame or a wedge under your mattress to raise it.   Other changes    Lose weight, if you need to    Don t exercise near bedtime    Don't wear tight-fitting clothes    Limit aspirin and ibuprofen    Stop smoking    [employer] last reviewed this educational content on 6/1/2019 2000-2020 [employer]. [address] [place] [address]. All rights reserved. This information is not intended as a substitute for professional medical care. Always follow your healthcare professional's instructions.

## 2021-01-25 NOTE — PROGRESS NOTES
Assessment & Plan     LUQ abdominal pain  Unclear etiology of chronic abdominal pain.  DDx: gastritis, peptic ulcer disease, muscular pain, colitis, increased stool burden, occult intraabdominal pathology.  Reviewed previous endoscopy report in 2016 in [facility]. No acute findings then but with small hiatal hernia found.  Advised repeating endoscopy and adding colonoscopy in case there is colonic involvement  Considering he has BM changes too.  Continue PPI. May hold sucralfate.  Advised prevention of acid reflux.  GI consult depending on results or if with still unexplained symptoms.  Return precautions discussed and given to patient.  - GASTROENTEROLOGY ADULT REF PROCEDURE ONLY    Change in bowel movement  Patient reports alternating diarrhea and normal BMs.  DDx: IBS, IBD, other colitis, paradoxical diarrhea.  No previous scopes or imaging available for review in [facility].  Patient concurred to pursue colonoscopy.  Further recommendations when results are out.  - GASTROENTEROLOGY ADULT REF PROCEDURE ONLY      Review of external notes as documented above   Review of prior external note(s) from - [facility] information from [facility]  reviewed       Patient Instructions   Schedule upper endoscopy and colonoscopy.  Further recommendation to be given then.    Continue esomeprazole for now  Avoid possible triggers for heartburn and abdominal symptoms.      Patient Education     Abdominal Pain  Abdominal pain is pain in the stomach or belly area. Everyone has this pain from time to time. In many cases it goes away on its own. But abdominal pain can sometimes be due to a serious problem, such as appendicitis. So it s important to know when to get help.     Causes of abdominal pain   There are many possible causes of abdominal pain. Common causes in adults include:    Constipation, diarrhea, or gas    Stomach acid flowing back up into the esophagus (acid reflux or heartburn)    Severe acid  reflux, called GERD (gastroesophageal reflux disease)    A sore in the lining of the stomach or small intestine (peptic ulcer)    Inflammation of the gallbladder, liver, or pancreas    Gallstones or kidney stones    Appendicitis     Intestinal blockage     An internal organ pushing through a muscle or other tissue (hernia)    Urinary tract infections    In women, menstrual cramps, fibroids, ovarian cysts, pelvic inflammatory disease, or endometriosis    Inflammation or infection of the intestines, including Crohn's disease and ulcerative colitis    Irritable bowel syndrome  Diagnosing the cause of abdominal pain   Your healthcare provider will give you a physical exam help find the cause of your pain. If needed, you will have tests. Belly pain has many possible causes. So it can be hard to find the reason for your pain. Giving details about your pain can help. Tell your provider where and when you feel the pain, and what makes it better or worse. Also let your provider know if you have other symptoms such as:     Fever    Tiredness    Upset stomach (nausea)    Vomiting    Changes in bathroom habits    Blood in the stool or black, tarry stool    Weight loss that you can't explain (involuntary weight loss?)  Also report any family history of stomach or intestinal problems, or cancers. Tell your provider about all your alcohol use and drug use. Tell your provider about all medicines you use, including herbs, vitamins, and supplements.   Treating abdominal pain   Some causes of pain need emergency medical treatment right away. These include appendicitis or a bowel blockage. Other problems can be treated with rest, fluids, or medicines. Your healthcare provider can give you specific instructions for treatment or self-care based on what is causing your pain.      If you have vomiting or diarrhea, sip water or other clear fluids. When you are ready to eat solid foods again, start with small amounts of easy-to-digest,  low-fat foods. These include apple sauce, toast, or crackers.   When to get medical care   Call 911 or go to the hospital right away if you:     Can t pass stool and are vomiting    Are vomiting blood or have bloody diarrhea or black, tarry diarrhea    Have chest, neck, or shoulder pain    Feel like you might pass out    Have pain in your shoulder blades with nausea    Have sudden, severe belly pain    Have new, severe pain unlike any you have felt before    Have a belly that is rigid, hard, and hurts to touch  Call your healthcare provider if you have:     Pain for more than 5 days    Bloating for more than 2 days    Diarrhea for more than 5 days    A fever of 100.4 F (38 C) or higher, or as directed by your healthcare provider    Pain that gets worse    Weight loss for no reason    Continued lack of appetite    Blood in your stool  How to prevent abdominal pain   Here are some tips to help prevent abdominal pain:     Eat smaller amounts of food at each meal.    Don't eat greasy, fried, or other high-fat foods.    Don't eat foods that give you gas.    Exercise regularly.    Drink plenty of fluids.  To help prevent GERD symptoms:     Quit smoking.    Reduce alcohol and foods that increase stomach acid.    Don't use aspirin or over-the-counter pain and fever medicines, if possible. This includes nonsteroidal anti-inflammatory drugs (NSAIDs).    Lose excess weight.    Finish eating at least 2 hours before you go to bed or lie down.    Raise the head of your bed.  [employer] last reviewed this educational content on 4/1/2019 2000-2020 [employer]. [address] [place] [address], [place] [address]. All rights reserved. This information is not intended as a substitute for professional medical care. Always follow your healthcare professional's instructions.           Patient Education     Tips to Control Acid Reflux    To control acid reflux, you ll need to make some basic diet and lifestyle changes. The simple  steps outlined below may be all you ll need to ease discomfort.   Watch what you eat    Don't have fatty foods or spicy foods.    Eat fewer acidic foods, such as citrus and tomato-based foods. These can increase symptoms.    Limit drinking alcohol, caffeine, and fizzy beverages. All increase acid reflux.    Try limiting chocolate, peppermint, and spearmint. These can make acid reflux worse in some people.    Watch when you eat    Don't lie down for 3 hours after eating.    Don't snack before going to bed.    Raise your head  Raising your head and upper body by 4 to 6 inches helps limit reflux when you re lying down. Put blocks under the head of your bed frame or a wedge under your mattress to raise it.   Other changes    Lose weight, if you need to    Don t exercise near bedtime    Don't wear tight-fitting clothes    Limit aspirin and ibuprofen    Stop smoking    [employer] last reviewed this educational content on 6/1/2019 2000-2020 [employer]. [address] [place] [address]. All rights reserved. This information is not intended as a substitute for professional medical care. Always follow your healthcare professional's instructions.               Return in about 1 month (around 2/25/2021) for depending on result of the tests.    Evan Brooks MD  [facility]    Latoya Cheng is a 40 year old who presents to clinic today for the following health issues     HPI       Abdominal/Flank Pain  Onset/Duration: Ongoing, worse x 3 Months   Description:   Character: Dull ache - constant  Location: left upper quadrant  Radiation: None  Different than the pain of GERD he had 2 months ago.  Currently present and mild  Intensity: mild to moderate   Progression of Symptoms:  constant  Accompanying Signs & Symptoms:  Fever/Chills: no  Gas/Bloating: YES- both  Nausea: no  Vomitting: no  Diarrhea: YES- comes and goes   Constipation: no  Dysuria or Hematuria: no  History:  "  Trauma: no  Previous similar pain: yes  Previous tests done: Upper Endoscopy 2016 - was told \"had signs of GERD\" - was placed on Ranitidine but changed to famotidine due to recall   Precipitating factors:   Does the pain change with:     Food: no    Bowel Movement: no    Urination: no   Other factors:  no  Therapies tried and outcome: mastic gum, nexium, Carafate, Pro biotic     Patient Active Problem List   Diagnosis     Gastroesophageal reflux disease     Irritable bowel syndrome with diarrhea     Lactose intolerance     Allergic rhinitis     Other chronic allergic conjunctivitis     History reviewed. No pertinent surgical history.    Social History     Tobacco Use     Smoking status: Never Smoker     Smokeless tobacco: Former User   Substance Use Topics     Alcohol use: Not Currently     Comment: Occasional     Family History   Problem Relation Age of Onset     Breast Cancer Mother      Hypertension Mother      Hyperlipidemia Mother      Diabetes Maternal Grandfather          Current Outpatient Medications   Medication Sig Dispense Refill     esomeprazole (NEXIUM) 20 MG DR capsule Take 20 mg by mouth every morning (before breakfast) Take 30-60 minutes before eating.       Probiotic Product (PRO-BIOTIC BLEND PO) 2 capsule 3-4 times per week       sucralfate (CARAFATE) 1 GM/10ML suspension Take 10 mLs (1 g) by mouth 4 times daily 420 mL 3     UNABLE TO FIND MEDICATION NAME: mastic gum - Daily       Allergies   Allergen Reactions     Milk Protein Extract      Milk         Review of Systems   Constitutional, HEENT, cardiovascular, pulmonary, GI, , musculoskeletal, neuro, skin, endocrine and psych systems are negative, except as otherwise noted.      Objective    /80   Pulse 77   Temp 96.1  F (35.6  C) (Tympanic)   Ht 1.848 m (6' 0.75\")   Wt 85.7 kg (189 lb)   SpO2 97%   BMI 25.11 kg/m    Body mass index is 25.11 kg/m .  Physical Exam   GENERAL: borderline overweight, alert and no distress  NECK: no " tenderness, no adenopathy,  Thyroid not enlarged  RESP: lungs clear to auscultation - no rales, no rhonchi, no wheezes  CV: regular rates and rhythm, normal S1 S2, no S3 or S4 and no murmur, no click or rub  ABD: slightly rounded abdomen, no skin changes, no TTP, no mass, no guarding, no Aldrich's sign  MS: extremities- no gross deformities noted, no edema  SKIN: no jaundice or rash    No results found for any visits on 01/25/21.

## 2021-01-31 DIAGNOSIS — Z11.59 ENCOUNTER FOR SCREENING FOR OTHER VIRAL DISEASES: ICD-10-CM

## 2021-02-12 DIAGNOSIS — Z11.59 ENCOUNTER FOR SCREENING FOR OTHER VIRAL DISEASES: ICD-10-CM

## 2021-02-12 LAB
LABORATORY COMMENT REPORT: NORMAL
SARS-COV-2 RNA RESP QL NAA+PROBE: NEGATIVE
SARS-COV-2 RNA RESP QL NAA+PROBE: NORMAL
SPECIMEN SOURCE: NORMAL
SPECIMEN SOURCE: NORMAL

## 2021-02-12 PROCEDURE — U0005 INFEC AGEN DETEC AMPLI PROBE: HCPCS | Performed by: SURGERY

## 2021-02-12 PROCEDURE — U0003 INFECTIOUS AGENT DETECTION BY NUCLEIC ACID (DNA OR RNA); SEVERE ACUTE RESPIRATORY SYNDROME CORONAVIRUS 2 (SARS-COV-2) (CORONAVIRUS DISEASE [COVID-19]), AMPLIFIED PROBE TECHNIQUE, MAKING USE OF HIGH THROUGHPUT TECHNOLOGIES AS DESCRIBED BY CMS-2020-01-R: HCPCS | Performed by: SURGERY

## 2021-02-15 ENCOUNTER — ANESTHESIA EVENT (OUTPATIENT)
Dept: GASTROENTEROLOGY | Facility: CLINIC | Age: 41
End: 2021-02-15
Payer: COMMERCIAL

## 2021-02-15 NOTE — ANESTHESIA PREPROCEDURE EVALUATION
Anesthesia Pre-Procedure Evaluation    Patient: Prosper Ponce   MRN: [id number] : 1980        Preoperative Diagnosis: Abdominal pain, left upper quadrant [R10.12]  Change in bowel movement [R19.8]   Procedure : Procedure(s):  COLONOSCOPY  ESOPHAGOGASTRODUODENOSCOPY (EGD)     History reviewed. No pertinent past medical history.   History reviewed. No pertinent surgical history.   Allergies   Allergen Reactions     Dust Mites Itching     Milk Protein Extract      Milk      Social History     Tobacco Use     Smoking status: Never Smoker     Smokeless tobacco: Former User   Substance Use Topics     Alcohol use: Not Currently     Comment: Occasional      Wt Readings from Last 1 Encounters:   21 85.7 kg (189 lb)        Anesthesia Evaluation   Pt has had prior anesthetic.     No history of anesthetic complications       ROS/MED HX  ENT/Pulmonary: Comment: Chronic conjunctivitis    (+) allergic rhinitis,     Neurologic:  - neg neurologic ROS     Cardiovascular:     (+) -----Previous cardiac testing   Echo: Date: Results:    Stress Test: Date: Results:    ECG Reviewed: Date: 20 Results:  SR  Cath: Date: Results:      METS/Exercise Tolerance: >4 METS    Hematologic:  - neg hematologic  ROS     Musculoskeletal:  - neg musculoskeletal ROS     GI/Hepatic: Comment: Change in bowel habit  Abdominal pain  Increased risk of aspiration due to GERD  IBS-D    (+) GERD, Symptomatic,     Renal/Genitourinary:  - neg Renal ROS     Endo:  - neg endo ROS     Psychiatric/Substance Use:  - neg psychiatric ROS     Infectious Disease:  - neg infectious disease ROS     Malignancy:  - neg malignancy ROS     Other:  - neg other ROS          Physical Exam    Airway        Mallampati: I   TM distance: > 3 FB   Neck ROM: full   Mouth opening: > 3 cm    Respiratory Devices and Support         Dental  no notable dental history         Cardiovascular   cardiovascular exam normal          Pulmonary   pulmonary exam normal                 OUTSIDE LABS:  CBC:   Lab Results   Component Value Date    WBC 10.8 11/23/2020    WBC 7.4 10/14/2010    HGB 16.6 11/23/2020    HGB 16.0 10/14/2010    HCT 46.5 11/23/2020    HCT 45.6 10/14/2010     11/23/2020     10/14/2010     BMP:   Lab Results   Component Value Date     11/23/2020     10/14/2010    POTASSIUM 3.6 11/23/2020    POTASSIUM 3.4 10/14/2010    CHLORIDE 104 11/23/2020    CHLORIDE 103 10/14/2010    CO2 28 11/23/2020    CO2 25 10/14/2010    BUN 10 11/23/2020    BUN 9 10/14/2010    CR 0.97 11/23/2020    CR 0.93 10/14/2010     (H) 11/23/2020     (H) 10/14/2010     COAGS: No results found for: PTT, INR, FIBR  POC: No results found for: BGM, HCG, HCGS  HEPATIC:   Lab Results   Component Value Date    ALBUMIN 4.1 11/23/2020    PROTTOTAL 7.7 11/23/2020    ALT 28 11/23/2020    AST 15 11/23/2020    ALKPHOS 92 11/23/2020    BILITOTAL 0.3 11/23/2020     OTHER:   Lab Results   Component Value Date    SOY 9.2 11/23/2020       Anesthesia Plan    ASA Status:  2   NPO Status:  NPO Appropriate    Anesthesia Type: General.   Induction: Propofol.   Maintenance: TIVA.        Consents    Anesthesia Plan(s) and associated risks, benefits, and realistic alternatives discussed. Questions answered and patient/representative(s) expressed understanding.     - Discussed with:  Patient      - Extended Intubation/Ventilatory Support Discussed: no Extended Intubation.      - Patient is DNR/DNI Status: No    Use of blood products discussed: No .     Postoperative Care    Pain management: IV analgesics, Oral pain medications, Multi-modal analgesia.   PONV prophylaxis: Ondansetron (or other 5HT-3), Dexamethasone or Solumedrol     Comments:                ARSH Rodrigues CRNA

## 2021-02-16 ENCOUNTER — HOSPITAL ENCOUNTER (OUTPATIENT)
Facility: CLINIC | Age: 41
Discharge: HOME | End: 2021-02-16
Attending: SURGERY | Admitting: SURGERY
Payer: COMMERCIAL

## 2021-02-16 ENCOUNTER — ANESTHESIA (OUTPATIENT)
Dept: GASTROENTEROLOGY | Facility: CLINIC | Age: 41
End: 2021-02-16
Payer: COMMERCIAL

## 2021-02-16 VITALS
RESPIRATION RATE: 18 BRPM | HEART RATE: 85 BPM | BODY MASS INDEX: 25.6 KG/M2 | TEMPERATURE: 98 F | SYSTOLIC BLOOD PRESSURE: 133 MMHG | HEIGHT: 72 IN | OXYGEN SATURATION: 98 % | DIASTOLIC BLOOD PRESSURE: 97 MMHG | WEIGHT: 189 LBS

## 2021-02-16 LAB
COLONOSCOPY: NORMAL
UPPER GI ENDOSCOPY: NORMAL

## 2021-02-16 PROCEDURE — 250N000009 HC RX 250: Performed by: NURSE ANESTHETIST, CERTIFIED REGISTERED

## 2021-02-16 PROCEDURE — 88305 TISSUE EXAM BY PATHOLOGIST: CPT | Mod: TC | Performed by: SURGERY

## 2021-02-16 PROCEDURE — 88305 TISSUE EXAM BY PATHOLOGIST: CPT | Mod: 26 | Performed by: PATHOLOGY

## 2021-02-16 PROCEDURE — 45380 COLONOSCOPY AND BIOPSY: CPT | Performed by: SURGERY

## 2021-02-16 PROCEDURE — 258N000003 HC RX IP 258 OP 636: Performed by: SURGERY

## 2021-02-16 PROCEDURE — 250N000011 HC RX IP 250 OP 636: Performed by: NURSE ANESTHETIST, CERTIFIED REGISTERED

## 2021-02-16 PROCEDURE — 250N000009 HC RX 250: Performed by: SURGERY

## 2021-02-16 PROCEDURE — 43239 EGD BIOPSY SINGLE/MULTIPLE: CPT | Performed by: SURGERY

## 2021-02-16 PROCEDURE — 370N000017 HC ANESTHESIA TECHNICAL FEE, PER MIN: Performed by: SURGERY

## 2021-02-16 PROCEDURE — 43239 EGD BIOPSY SINGLE/MULTIPLE: CPT | Mod: 51 | Performed by: SURGERY

## 2021-02-16 RX ORDER — PROPOFOL 10 MG/ML
INJECTION, EMULSION INTRAVENOUS CONTINUOUS PRN
Status: DISCONTINUED | OUTPATIENT
Start: 2021-02-16 | End: 2021-02-16

## 2021-02-16 RX ORDER — LIDOCAINE HYDROCHLORIDE 10 MG/ML
INJECTION, SOLUTION INFILTRATION; PERINEURAL PRN
Status: DISCONTINUED | OUTPATIENT
Start: 2021-02-16 | End: 2021-02-16

## 2021-02-16 RX ORDER — PROPOFOL 10 MG/ML
INJECTION, EMULSION INTRAVENOUS PRN
Status: DISCONTINUED | OUTPATIENT
Start: 2021-02-16 | End: 2021-02-16

## 2021-02-16 RX ORDER — LIDOCAINE 40 MG/G
CREAM TOPICAL
Status: DISCONTINUED | OUTPATIENT
Start: 2021-02-16 | End: 2021-02-16 | Stop reason: HOSPADM

## 2021-02-16 RX ORDER — ONDANSETRON 2 MG/ML
4 INJECTION INTRAMUSCULAR; INTRAVENOUS
Status: DISCONTINUED | OUTPATIENT
Start: 2021-02-16 | End: 2021-02-16 | Stop reason: HOSPADM

## 2021-02-16 RX ORDER — SODIUM CHLORIDE, SODIUM LACTATE, POTASSIUM CHLORIDE, CALCIUM CHLORIDE 600; 310; 30; 20 MG/100ML; MG/100ML; MG/100ML; MG/100ML
INJECTION, SOLUTION INTRAVENOUS CONTINUOUS
Status: DISCONTINUED | OUTPATIENT
Start: 2021-02-16 | End: 2021-02-16 | Stop reason: HOSPADM

## 2021-02-16 RX ORDER — GLYCOPYRROLATE 0.2 MG/ML
INJECTION, SOLUTION INTRAMUSCULAR; INTRAVENOUS PRN
Status: DISCONTINUED | OUTPATIENT
Start: 2021-02-16 | End: 2021-02-16

## 2021-02-16 RX ADMIN — LIDOCAINE HYDROCHLORIDE 50 MG: 10 INJECTION, SOLUTION INFILTRATION; PERINEURAL at 11:38

## 2021-02-16 RX ADMIN — GLYCOPYRROLATE 0.2 MG: 0.2 INJECTION, SOLUTION INTRAMUSCULAR; INTRAVENOUS at 11:38

## 2021-02-16 RX ADMIN — LIDOCAINE HYDROCHLORIDE 0.2 ML: 10 INJECTION, SOLUTION EPIDURAL; INFILTRATION; INTRACAUDAL; PERINEURAL at 11:05

## 2021-02-16 RX ADMIN — SODIUM CHLORIDE, POTASSIUM CHLORIDE, SODIUM LACTATE AND CALCIUM CHLORIDE 30 ML: 600; 310; 30; 20 INJECTION, SOLUTION INTRAVENOUS at 11:05

## 2021-02-16 RX ADMIN — PROPOFOL 200 MCG/KG/MIN: 10 INJECTION, EMULSION INTRAVENOUS at 11:38

## 2021-02-16 RX ADMIN — PROPOFOL 50 MG: 10 INJECTION, EMULSION INTRAVENOUS at 11:40

## 2021-02-16 RX ADMIN — PROPOFOL 100 MG: 10 INJECTION, EMULSION INTRAVENOUS at 11:38

## 2021-02-16 ASSESSMENT — MIFFLIN-ST. JEOR: SCORE: 1796.49

## 2021-02-16 NOTE — ANESTHESIA CARE TRANSFER NOTE
Patient: Prosper Ponce    Procedure(s):  COLONOSCOPY, WITH POLYPECTOMY AND BIOPSY  ESOPHAGOGASTRODUODENOSCOPY, WITH BIOPSY    Diagnosis: Abdominal pain, left upper quadrant [R10.12]  Change in bowel movement [R19.8]  Diagnosis Additional Information: No value filed.    Anesthesia Type:   General     Note:    Oropharynx: spontaneously breathing  Level of Consciousness: drowsy  Oxygen Supplementation: nasal cannula  Level of Supplemental Oxygen (L/min / FiO2): 2  Independent Airway: airway patency satisfactory and stable  Dentition: dentition unchanged  Vital Signs Stable: post-procedure vital signs reviewed and stable  Report to RN Given: handoff report given  Patient transferred to: Phase II    Handoff Report: Identifed the Patient, Identified the Reponsible Provider, Reviewed the pertinent medical history, Discussed the surgical course, Reviewed Intra-OP anesthesia mangement and issues during anesthesia, Set expectations for post-procedure period and Allowed opportunity for questions and acknowledgement of understanding      Vitals: (Last set prior to Anesthesia Care Transfer)  CRNA VITALS  2/16/2021 1143 - 2/16/2021 1215      2/16/2021             Pulse:  75    SpO2:  96 %        Electronically Signed By: ARSH Rodrigues CRNA  February 16, 2021  12:15 PM

## 2021-02-16 NOTE — H&P
40 year old year old male here for upper endoscopy for evaluation of some GERD and LUQ pain.  He is also going to have a colonoscopy to evaluate chronic diarrhea symptoms.  No bleeding reported.        Patient Active Problem List   Diagnosis     Gastroesophageal reflux disease     Irritable bowel syndrome with diarrhea     Lactose intolerance     Allergic rhinitis     Other chronic allergic conjunctivitis       History reviewed. No pertinent past medical history.    History reviewed. No pertinent surgical history.    Family History   Problem Relation Age of Onset     Breast Cancer Mother      Hypertension Mother      Hyperlipidemia Mother      Diabetes Maternal Grandfather        No current outpatient medications on file.       Allergies   Allergen Reactions     Dust Mites Itching     Milk Protein Extract      Milk       Pt reports that he has never smoked. He has quit using smokeless tobacco. He reports previous alcohol use. He reports previous drug use.    Exam:    Awake, Alert OX3  Lungs - CTA bilaterally  CV - RRR, no murmurs, distal pulses intact  Abd - soft, non-distended, non-tender, +BS  Extr - No cyanosis or edema    A/P 40 year old year old male in need of upper endoscopy (EGD) and colonoscopy for the symptoms mentioned above. Risks, benefits, alternatives, and complications were discussed including the possibility of perforation and the patient agreed to proceed.    Addy Caldwell MD

## 2021-02-16 NOTE — ANESTHESIA POSTPROCEDURE EVALUATION
Patient: Prosper Ponce    Procedure(s):  COLONOSCOPY, WITH POLYPECTOMY AND BIOPSY  ESOPHAGOGASTRODUODENOSCOPY, WITH BIOPSY    Diagnosis:Abdominal pain, left upper quadrant [R10.12]  Change in bowel movement [R19.8]  Diagnosis Additional Information: No value filed.    Anesthesia Type:  General    Note:  Disposition: Outpatient   Postop Pain Control: Uneventful            Sign Out: Well controlled pain   PONV: No   Neuro/Psych: Uneventful            Sign Out: Acceptable/Baseline neuro status   Airway/Respiratory: Uneventful            Sign Out: Acceptable/Baseline resp. status   CV/Hemodynamics: Uneventful            Sign Out: Acceptable CV status   Other NRE: NONE   DID A NON-ROUTINE EVENT OCCUR? No         Last vitals:  Vitals:    02/16/21 1040   BP: (!) 127/91   Pulse: 85   Temp: 36.7  C (98  F)   SpO2: 98%       Last vitals prior to Anesthesia Care Transfer:  CRNA VITALS  2/16/2021 1143 - 2/16/2021 1216      2/16/2021             Pulse:  75    SpO2:  96 %          Electronically Signed By: ARSH Rodrigues CRNA  February 16, 2021  12:16 PM

## 2021-02-16 NOTE — BRIEF OP NOTE
[facility]   Brief Operative Note    Pre-operative diagnosis: Abdominal pain, left upper quadrant [R10.12]  Change in bowel movement [R19.8]   Post-operative diagnosis small hiatal hernia, otherwise normal EGD and colonoscopy     Procedure: Procedure(s):  COLONOSCOPY, WITH POLYPECTOMY AND BIOPSY  ESOPHAGOGASTRODUODENOSCOPY, WITH BIOPSY   Surgeon(s): Surgeon(s) and Role:     * Addy Caldwell MD - Primary   Estimated blood loss: * No values recorded between 2/16/2021 11:38 AM and 2/16/2021 12:10 PM *    Specimens: ID Type Source Tests Collected by Time Destination   A : for hpylori Tissue Stomach, Antrum SURGICAL PATHOLOGY EXAM Addy Caldwell MD 2/16/2021 11:43 AM    B : RANDOM COLON BIOPSIES Tissue Colon SURGICAL PATHOLOGY EXAM Addy Caldwell MD 2/16/2021 12:04 PM       Findings: 1. Normal esophagus, z-line at 40 cm  2. Small hiatal hernia (2 cm)  3. Normal stomach - biopsied for h. Pylori  4. Normal duodenum  5. Normal colon - biopsied for microscopic colitis

## 2021-02-18 LAB — COPATH REPORT: NORMAL

## 2021-10-24 ENCOUNTER — HEALTH MAINTENANCE LETTER (OUTPATIENT)
Age: 41
End: 2021-10-24

## 2021-11-30 ENCOUNTER — NURSE TRIAGE (OUTPATIENT)
Dept: NURSING | Facility: CLINIC | Age: 41
End: 2021-11-30
Payer: COMMERCIAL

## 2021-11-30 ENCOUNTER — OFFICE VISIT (OUTPATIENT)
Dept: URGENT CARE | Facility: URGENT CARE | Age: 41
End: 2021-11-30
Payer: COMMERCIAL

## 2021-11-30 VITALS
RESPIRATION RATE: 18 BRPM | SYSTOLIC BLOOD PRESSURE: 138 MMHG | WEIGHT: 198 LBS | BODY MASS INDEX: 26.8 KG/M2 | DIASTOLIC BLOOD PRESSURE: 90 MMHG | TEMPERATURE: 98 F | HEART RATE: 99 BPM | OXYGEN SATURATION: 97 %

## 2021-11-30 DIAGNOSIS — L30.9 DERMATITIS: ICD-10-CM

## 2021-11-30 DIAGNOSIS — L73.9 FOLLICULITIS: Primary | ICD-10-CM

## 2021-11-30 PROCEDURE — 99213 OFFICE O/P EST LOW 20 MIN: CPT | Performed by: NURSE PRACTITIONER

## 2021-11-30 RX ORDER — TRIAMCINOLONE ACETONIDE 1 MG/G
OINTMENT TOPICAL 2 TIMES DAILY
Qty: 30 G | Refills: 0 | Status: SHIPPED | OUTPATIENT
Start: 2021-11-30 | End: 2022-12-23

## 2021-11-30 NOTE — TELEPHONE ENCOUNTER
Pt called in states he has rash.  The rash is 1 inch red area.  On his Left knee and on the right leg above his knee at the back.  The size is small and there is redness.  No itching.  There is pain when touch it.  No fever.  The problem with walk.  Pt temp is 97.88 temporal.  Pt has history of skin soreness.  The disposition is to be seen with in 4 hours  Care advice given per protocol.  Patient agrees with care advice given.   Agreed to call back if he has additional symptoms or questions.    Mark Scott Ranger Nurse Advisor 11/30/2021 2:20 PM      Reason for Disposition    [1] Localized purple or blood-colored spots or dots AND [2] not from injury or friction AND [3] no fever    Additional Information    Negative: [1] Sudden onset of rash (within last 2 hours) AND [2] difficulty with breathing or swallowing    Negative: Sounds like a life-threatening emergency to the triager    Negative: Poison ivy, oak, or sumac contact suspected    Negative: Insect bite(s) suspected    Negative: Ringworm suspected (i.e., round pink patch, sometimes looks like ring, usually 1/2 to 1 inch [12-25 mm],  in size, slowly increasing in size)    Negative: Athlete's Foot suspected (i.e., itchy rash between the toes)    Negative: Jock Itch suspected (i.e., itchy rash on inner thighs near genital area)    Negative: Wound infection suspected (i.e., pain, spreading redness, or pus; in a cut, puncture, scrape or sutured wound)    Negative: Impetigo suspected  (i.e., painless infected superficial small sores, less than 1 inch or 2.5 cm, often covered by a soft, yellow-brown scab or crust; sometimes occurring near nasal openings)    Negative: Shingles suspected (i.e., painful rash, multiple small blisters grouped together in one area of body; dermatomal distribution)    Negative: Rash of external female genital area (vulva)    Negative: Rash of penis or scrotum    Negative: Small spot, skin growth, or mole    Negative: Sores or skin ulcer,  not a rash    Negative: Localized lump (or swelling) without redness or rash    Negative: [1] Localized purple or blood-colored spots or dots AND [2] not from injury or friction AND [3] fever    Negative: Patient sounds very sick or weak to the triager    Negative: [1] Red area or streak AND [2] fever    Negative: [1] Rash is painful to touch AND [2] fever    Negative: [1] Looks infected (spreading redness, pus) AND [2] large red area (> 2 in. or 5 cm)    Negative: [1] Looks infected (spreading redness, pus) AND [2] diabetes mellitus or weak immune system (e.g., HIV positive, cancer chemo, splenectomy, organ transplant, chronic steroids)    Protocols used: RASH OR REDNESS - [id number]

## 2021-11-30 NOTE — PROGRESS NOTES
"Assessment & Plan     Folliculitis  Acute folliculitis on left posterior thigh without infection. Recommend warm compresses several times a day 15 minutes on/15 minutes off. If not improving or worsening would recommend scheduling with dermatology.   - Adult Dermatology Referral; Future    Dermatitis  Acute, dermatitis on left lateral knee. Recommend steroid ointment two times daily followed by an emollient such as Aquaphor or Cetaphil - make sure to buy the lotion in the tub. If not improving or worsening follow up with primary care provider in 2-3 weeks.   - triamcinolone (KENALOG) 0.1 % external ointment; Apply topically 2 times daily           BMI:   Estimated body mass index is 26.8 kg/m  as calculated from the following:    Height as of 2/16/21: 1.831 m (6' 0.08\").    Weight as of this encounter: 89.8 kg (198 lb).   Weight management plan: Patient was referred to their PCP to discuss a diet and exercise plan.    See Patient Instructions    Return in about 2 weeks (around 12/14/2021), or if symptoms worsen or fail to improve.    Julia Gallardo, DNP, APRN-CNP   [facility]    Subjective     Prosper Ponce is a 41 year old male who presents today for the following   health issues:    HPI    Skin/Cellulitus       Duration: 1 week - pimple, dry patchy skin a few months     Description (location/character/radiation): right leg, pimple warm, swollen - left leg with patchy white flaking skin     Intensity:  mild    Accompanying signs and symptoms: painful/pressure when being touched     History (similar episodes/previous evaluation): had 3 months ago as well, but went away     Precipitating or alleviating factors: None    Therapies tried and outcome: None       Review of Systems    Constitutional, HEENT, cardiovascular, pulmonary, gi and gu systems are negative, except as otherwise noted.    Objective   BP (!) 138/90 (BP Location: Right arm, Patient Position: Sitting, Cuff Size: Adult " Regular)   Pulse 99   Temp 98  F (36.7  C) (Tympanic)   Resp 18   Wt 89.8 kg (198 lb)   SpO2 97%   BMI 26.80 kg/m    Body mass index is 26.8 kg/m .    Physical Exam  GENERAL APPEARANCE: healthy, alert and no distress  SKIN: 0.5 cm tender, firm nodule on posterior right thigh without erythema, warmth or drainage  PSYCH: mentation appears normal and affect normal/bright    Diagnostic Test Results:  none      Chart documentation with Dragon Voice recognition Software. Although reviewed after completion, some words and grammatical errors may remain.

## 2021-12-15 ENCOUNTER — OFFICE VISIT (OUTPATIENT)
Dept: FAMILY MEDICINE | Facility: CLINIC | Age: 41
End: 2021-12-15
Payer: COMMERCIAL

## 2021-12-15 VITALS
HEART RATE: 97 BPM | OXYGEN SATURATION: 96 % | DIASTOLIC BLOOD PRESSURE: 82 MMHG | TEMPERATURE: 97.4 F | SYSTOLIC BLOOD PRESSURE: 110 MMHG | HEIGHT: 73 IN | BODY MASS INDEX: 26.51 KG/M2 | WEIGHT: 200 LBS

## 2021-12-15 DIAGNOSIS — E78.2 MIXED HYPERLIPIDEMIA: ICD-10-CM

## 2021-12-15 DIAGNOSIS — Z00.00 ROUTINE GENERAL MEDICAL EXAMINATION AT A HEALTH CARE FACILITY: Primary | ICD-10-CM

## 2021-12-15 DIAGNOSIS — K44.9 HIATAL HERNIA: ICD-10-CM

## 2021-12-15 DIAGNOSIS — Z23 NEED FOR PROPHYLACTIC VACCINATION AND INOCULATION AGAINST INFLUENZA: ICD-10-CM

## 2021-12-15 DIAGNOSIS — K21.9 GASTROESOPHAGEAL REFLUX DISEASE, UNSPECIFIED WHETHER ESOPHAGITIS PRESENT: ICD-10-CM

## 2021-12-15 DIAGNOSIS — Z11.59 NEED FOR HEPATITIS C SCREENING TEST: ICD-10-CM

## 2021-12-15 DIAGNOSIS — Z11.4 SCREENING FOR HUMAN IMMUNODEFICIENCY VIRUS: ICD-10-CM

## 2021-12-15 LAB
ALBUMIN SERPL-MCNC: 3.7 G/DL (ref 3.4–5)
ALP SERPL-CCNC: 83 U/L (ref 40–150)
ALT SERPL W P-5'-P-CCNC: 38 U/L (ref 0–70)
ANION GAP SERPL CALCULATED.3IONS-SCNC: 7 MMOL/L (ref 3–14)
AST SERPL W P-5'-P-CCNC: 17 U/L (ref 0–45)
BILIRUB SERPL-MCNC: 0.6 MG/DL (ref 0.2–1.3)
BUN SERPL-MCNC: 10 MG/DL (ref 7–30)
CALCIUM SERPL-MCNC: 9.2 MG/DL (ref 8.5–10.1)
CHLORIDE BLD-SCNC: 107 MMOL/L (ref 94–109)
CHOLEST SERPL-MCNC: 193 MG/DL
CO2 SERPL-SCNC: 28 MMOL/L (ref 20–32)
CREAT SERPL-MCNC: 0.99 MG/DL (ref 0.66–1.25)
FASTING STATUS PATIENT QL REPORTED: YES
GFR SERPL CREATININE-BSD FRML MDRD: >90 ML/MIN/1.73M2
GLUCOSE BLD-MCNC: 87 MG/DL (ref 70–99)
HDLC SERPL-MCNC: 37 MG/DL
LDLC SERPL CALC-MCNC: 122 MG/DL
NONHDLC SERPL-MCNC: 156 MG/DL
POTASSIUM BLD-SCNC: 3.8 MMOL/L (ref 3.4–5.3)
PROT SERPL-MCNC: 7.7 G/DL (ref 6.8–8.8)
SODIUM SERPL-SCNC: 142 MMOL/L (ref 133–144)
TRIGL SERPL-MCNC: 172 MG/DL

## 2021-12-15 PROCEDURE — 80061 LIPID PANEL: CPT | Performed by: FAMILY MEDICINE

## 2021-12-15 PROCEDURE — 99396 PREV VISIT EST AGE 40-64: CPT | Mod: 25 | Performed by: FAMILY MEDICINE

## 2021-12-15 PROCEDURE — 90471 IMMUNIZATION ADMIN: CPT | Performed by: FAMILY MEDICINE

## 2021-12-15 PROCEDURE — 86803 HEPATITIS C AB TEST: CPT | Performed by: FAMILY MEDICINE

## 2021-12-15 PROCEDURE — 80053 COMPREHEN METABOLIC PANEL: CPT | Performed by: FAMILY MEDICINE

## 2021-12-15 PROCEDURE — 90686 IIV4 VACC NO PRSV 0.5 ML IM: CPT | Performed by: FAMILY MEDICINE

## 2021-12-15 PROCEDURE — 36415 COLL VENOUS BLD VENIPUNCTURE: CPT | Performed by: FAMILY MEDICINE

## 2021-12-15 PROCEDURE — 87389 HIV-1 AG W/HIV-1&-2 AB AG IA: CPT | Performed by: FAMILY MEDICINE

## 2021-12-15 PROCEDURE — 99213 OFFICE O/P EST LOW 20 MIN: CPT | Mod: 25 | Performed by: FAMILY MEDICINE

## 2021-12-15 ASSESSMENT — ENCOUNTER SYMPTOMS
SHORTNESS OF BREATH: 0
FREQUENCY: 0
EYE PAIN: 0
COUGH: 0
DIARRHEA: 1
PALPITATIONS: 0
HEARTBURN: 1
SORE THROAT: 0
CHILLS: 0
MYALGIAS: 0
PARESTHESIAS: 0
ARTHRALGIAS: 0
DIZZINESS: 0
ABDOMINAL PAIN: 1
DYSURIA: 0
CONSTIPATION: 0
HEMATOCHEZIA: 0
JOINT SWELLING: 0
WEAKNESS: 0
FEVER: 0
NERVOUS/ANXIOUS: 0
HEADACHES: 0
HEMATURIA: 0
NAUSEA: 0

## 2021-12-15 ASSESSMENT — MIFFLIN-ST. JEOR: SCORE: 1852.1

## 2021-12-15 NOTE — PROGRESS NOTES
SUBJECTIVE:   CC: Prosper Ponce is an 41 year old male who presents for preventative health visit.     Patient has been advised of split billing requirements and indicates understanding: Yes  Healthy Habits:     Getting at least 3 servings of Calcium per day:  Yes    Bi-annual eye exam:  NO    Dental care twice a year:  Yes    Sleep apnea or symptoms of sleep apnea:  None    Diet:  Other    Frequency of exercise:  None    Taking medications regularly:  Yes    Barriers to taking medications:  None    Medication side effects:  Not applicable    PHQ-2 Total Score: 0    Additional concerns today:  Yes (discuss prilosec/ zantac)    Correction: patient does get eye exam every year.    Took nexium for 10 months - reduced GERD   Ranitidine also taken with no relief.  Fiber pills helped with overall GI function though.  Since stopping PPI few months ago - mild GERD. Takes Tums occasionally.  EGD and colonoscopy in Feb 2021 - small hiatal hernia found. Polyps found.    Today's PHQ-2 Score:   PHQ-2 ( 1999 [employer]) 12/15/2021   Q1: Little interest or pleasure in doing things 0   Q2: Feeling down, depressed or hopeless 0   PHQ-2 Score 0   PHQ-2 Total Score (12-17 Years)- Positive if 3 or more points; Administer PHQ-A if positive -   Q1: Little interest or pleasure in doing things Not at all   Q2: Feeling down, depressed or hopeless Not at all   PHQ-2 Score 0       Abuse: Current or Past(Physical, Sexual or Emotional)- No  Do you feel safe in your environment? Yes    Have you ever done Advance Care Planning? (For example, a Health Directive, POLST, or a discussion with a medical provider or your loved ones about your wishes): No, advance care planning information given to patient to review.  Patient declined advance care planning discussion at this time.    Social History     Tobacco Use     Smoking status: Never Smoker     Smokeless tobacco: Never Used   Substance Use Topics     Alcohol use: Not Currently     Comment: Occasional      If you drink alcohol do you typically have >3 drinks per day or >7 drinks per week? No    Alcohol Use 12/15/2021   Prescreen: >3 drinks/day or >7 drinks/week? No   Prescreen: >3 drinks/day or >7 drinks/week? -   No flowsheet data found.    Last PSA: No results found for: PSA    Reviewed orders with patient. Reviewed health maintenance and updated orders accordingly - Yes  Patient Active Problem List   Diagnosis     Gastroesophageal reflux disease     Irritable bowel syndrome with diarrhea     Lactose intolerance     Allergic rhinitis     Other chronic allergic conjunctivitis     Past Surgical History:   Procedure Laterality Date     COLONOSCOPY N/A 2/16/2021    Procedure: COLONOSCOPY, WITH POLYPECTOMY AND BIOPSY;  Surgeon: Addy Caldwell MD;  Location: [facility]     ESOPHAGOSCOPY, GASTROSCOPY, DUODENOSCOPY (EGD), COMBINED N/A 2/16/2021    Procedure: ESOPHAGOGASTRODUODENOSCOPY, WITH BIOPSY;  Surgeon: Addy Caldwell MD;  Location: [facility]       Social History     Tobacco Use     Smoking status: Never Smoker     Smokeless tobacco: Never Used   Substance Use Topics     Alcohol use: Not Currently     Comment: Occasional     Family History   Problem Relation Age of Onset     Breast Cancer Mother      Hypertension Mother      Hyperlipidemia Mother      Diabetes Maternal Grandfather          Current Outpatient Medications   Medication Sig Dispense Refill     raNITIdine HCl (RANITIDINE 75 PO) 1-2 a day       esomeprazole (NEXIUM) 20 MG DR capsule Take 20 mg by mouth every morning (before breakfast) Take 30-60 minutes before eating. (Patient not taking: Reported on 12/15/2021)       Probiotic Product (PRO-BIOTIC BLEND PO) 2 capsule 3-4 times per week (Patient not taking: Reported on 12/15/2021)       sucralfate (CARAFATE) 1 GM/10ML suspension Take 10 mLs (1 g) by mouth 4 times daily (Patient not taking: Reported on 12/15/2021) 420 mL 3     triamcinolone (KENALOG) 0.1 % external ointment Apply topically 2 times daily (Patient  "not taking: Reported on 12/15/2021) 30 g 0     UNABLE TO FIND MEDICATION NAME: mastic gum - Daily (Patient not taking: Reported on 12/15/2021)       Allergies   Allergen Reactions     Dust Mites Itching     Milk Protein Extract      Milk       Reviewed and updated as needed this visit by clinical staff  Tobacco  Allergies  Meds   Med Hx  Surg Hx  Fam Hx  Soc Hx       Reviewed and updated as needed this visit by Provider               History reviewed. No pertinent past medical history.   Past Surgical History:   Procedure Laterality Date     COLONOSCOPY N/A 2/16/2021    Procedure: COLONOSCOPY, WITH POLYPECTOMY AND BIOPSY;  Surgeon: Addy Caldwell MD;  Location: [facility]     ESOPHAGOSCOPY, GASTROSCOPY, DUODENOSCOPY (EGD), COMBINED N/A 2/16/2021    Procedure: ESOPHAGOGASTRODUODENOSCOPY, WITH BIOPSY;  Surgeon: Addy Caldwell MD;  Location: [facility]       Review of Systems   Constitutional: Negative for chills and fever.   HENT: Negative for congestion, ear pain, hearing loss and sore throat.    Eyes: Negative for pain and visual disturbance.   Respiratory: Negative for cough and shortness of breath.    Cardiovascular: Negative for chest pain, palpitations and peripheral edema.   Gastrointestinal: Positive for abdominal pain, diarrhea and heartburn. Negative for constipation, hematochezia and nausea.   Genitourinary: Negative for dysuria, frequency, genital sores, hematuria and urgency.   Musculoskeletal: Negative for arthralgias, joint swelling and myalgias.   Skin: Positive for rash.   Neurological: Negative for dizziness, weakness, headaches and paresthesias.   Psychiatric/Behavioral: Negative for mood changes. The patient is not nervous/anxious.      OBJECTIVE:   BP (!) 118/90   Pulse 97   Temp 97.4  F (36.3  C) (Tympanic)   Ht 1.848 m (6' 0.75\")   Wt 90.7 kg (200 lb)   SpO2 96%   BMI 26.57 kg/m      Physical Exam  GENERAL APPEARANCE: healthy, alert and no distress  EYES: pink conj, no icterus, PERRL, " EOMI  HENT: ear canals and TM's normal, nose and mouth without ulcers or lesions, oropharynx clear and oral mucous membranes moist  NECK: no adenopathy, no asymmetry, masses, or scars and thyroid normal to palpation  RESP: lungs clear to auscultation - no rales, rhonchi or wheezes  CV: regular rates and rhythm, normal S1 S2, no S3 or S4, no murmur, click or rub, no peripheral edema and peripheral pulses strong  ABDOMEN: soft, nontender, no hepatosplenomegaly, no masses and bowel sounds normal  RECTAL: deferred  MS: no musculoskeletal defects are noted and gait is age appropriate without ataxia  SKIN: no suspicious lesions or rashes; trace flesh discoloration on lateral left knee consistent with resolved dermatitis; on right distal posterior thigh, there is a slightly scaly papule with central point of ingrown hair consistent with a resolved localized inflammation.  NEURO: Normal strength and tone, sensory exam grossly normal, mentation intact and speech normal     Diagnostic Test Results:  none     ASSESSMENT/PLAN:   Prosper was seen today for physical and imm/inj.    Diagnoses and all orders for this visit:    Routine general medical examination at a health care facility  -     Lipid panel reflex to direct LDL Fasting; Future  -     Comprehensive metabolic panel; Future  -     Lipid panel reflex to direct LDL Fasting  -     Comprehensive metabolic panel  Patient was advised on recommended screening and preventive health recommendations.  He verbalized understanding and agreed to the plans below.    Gastroesophageal reflux disease, unspecified whether esophagitis present  Hiatal hernia  -     Adult Gastro Ref - Consult Only; Future  -     OFFICE/OUTPT VISIT,EST,LEVL IV  Reviewed with patient again his meds. He verifies he takes famotidine otc.   Discussed may take omeprazole or esomeprazole otc daily for 2-3 weeks fi GERD increases.  Advised consult with GI - patient concurred.  Return precautions discussed and given  "to patient.     Need for hepatitis C screening test  -     Hepatitis C Screen Reflex to HCV RNA Quant and Genotype; Future  -     Hepatitis C Screen Reflex to HCV RNA Quant and Genotype    Screening for human immunodeficiency virus  -     HIV Antigen Antibody Combo; Future  -     HIV Antigen Antibody Combo    Need for prophylactic vaccination and inoculation against influenza  -     INFLUENZA VACCINE IM > 6 MONTHS VALENT IIV4 (AFLURIA/FLUZONE)    Other orders  -     REVIEW OF HEALTH MAINTENANCE PROTOCOL ORDERS        Patient has been advised of split billing requirements and indicates understanding: Yes  COUNSELING:   Reviewed preventive health counseling, as reflected in patient instructions    Estimated body mass index is 26.57 kg/m  as calculated from the following:    Height as of this encounter: 1.848 m (6' 0.75\").    Weight as of this encounter: 90.7 kg (200 lb).     Weight management plan: Discussed healthy diet and exercise guidelines    He reports that he has never smoked. He has never used smokeless tobacco.      Counseling Resources:  ATP IV Guidelines  Pooled Cohorts Equation Calculator  FRAX Risk Assessment  ICSI Preventive Guidelines  Dietary Guidelines for Americans, 2010  USDA's MyPlate  ASA Prophylaxis  Lung CA Screening    Evan rBooks MD  [facility]  "

## 2021-12-15 NOTE — PATIENT INSTRUCTIONS
You will be contacted in 1-2 days for results of your lab tests.    Schedule appointment with gastroenterology for consult for heartburn and hiatal hernia.    You may continue famotidine.  May take nexium or prilosec otc daily for 2-3 weeks if heartburn symptoms increases.    Get a covid-19 vaccine ASAP.    Preventative Care Visits include: Yearly physicals, Well-child visits, Welcome to Medicare visits, & Medicare yearly wellness exams.    The purpose of these visits is to discuss your medical history and prevent health problems before you are sick.  You may need to pay a copay, coinsurance or deductible if your visit today includes testing or treating for a new or existing condition.    Additional charges may be incurred for today's visit. If you have questions about what your insurance plan covers, please contact your Insurance Company's member service department.  If you have questions specific to a bill you have already received from [employer], please contact the [facility] Billing Office at [phone].     Preventive Health Recommendations  Male Ages 40 to 49    Yearly exam:             See your health care provider every year in order to  o   Review health changes.   o   Discuss preventive care.    o   Review your medicines if your doctor has prescribed any.    You should be tested each year for STDs (sexually transmitted diseases) if you re at risk.     Have a cholesterol test every 5 years.     Have a colonoscopy (test for colon cancer) if someone in your family has had colon cancer or polyps before age 50.     After age 45, have a diabetes test (fasting glucose). If you are at risk for diabetes, you should have this test every 3 years.      Talk with your health care provider about whether or not a prostate cancer screening test (PSA) is right for you.    Shots: Get a flu shot each year. Get a tetanus shot every 10 years.     Nutrition:    Eat at least 5 servings of fruits and vegetables daily.     Eat  whole-grain bread, whole-wheat pasta and brown rice instead of white grains and rice.     Get adequate Calcium and Vitamin D.     Lifestyle    Exercise for at least 150 minutes a week (30 minutes a day, 5 days a week). This will help you control your weight and prevent disease.     Limit alcohol to one drink per day.     No smoking.     Wear sunscreen to prevent skin cancer.     See your dentist every six months for an exam and cleaning.

## 2021-12-16 PROBLEM — E78.2 MIXED HYPERLIPIDEMIA: Status: ACTIVE | Noted: 2021-12-16

## 2021-12-16 LAB
HCV AB SERPL QL IA: NONREACTIVE
HIV 1+2 AB+HIV1 P24 AG SERPL QL IA: NONREACTIVE

## 2021-12-16 NOTE — RESULT ENCOUNTER NOTE
The 10-year ASCVD risk score (Syeda TOURE Jr., et al., 2013) is: 1.7%    Values used to calculate the score:      Age: 41 years      Sex: Male      Is Non- : No      Diabetic: No      Tobacco smoker: No      Systolic Blood Pressure: 110 mmHg      Is BP treated: No      HDL Cholesterol: 37 mg/dL      Total Cholesterol: 193 mg/dL

## 2022-10-16 ENCOUNTER — HEALTH MAINTENANCE LETTER (OUTPATIENT)
Age: 42
End: 2022-10-16

## 2022-12-23 ENCOUNTER — OFFICE VISIT (OUTPATIENT)
Dept: FAMILY MEDICINE | Facility: CLINIC | Age: 42
End: 2022-12-23
Payer: COMMERCIAL

## 2022-12-23 VITALS
DIASTOLIC BLOOD PRESSURE: 76 MMHG | HEART RATE: 98 BPM | TEMPERATURE: 97.5 F | BODY MASS INDEX: 25.53 KG/M2 | WEIGHT: 192.2 LBS | OXYGEN SATURATION: 97 % | RESPIRATION RATE: 16 BRPM | SYSTOLIC BLOOD PRESSURE: 120 MMHG

## 2022-12-23 DIAGNOSIS — B34.9 VIRAL INFECTION: ICD-10-CM

## 2022-12-23 DIAGNOSIS — K21.9 GASTROESOPHAGEAL REFLUX DISEASE, UNSPECIFIED WHETHER ESOPHAGITIS PRESENT: Primary | ICD-10-CM

## 2022-12-23 PROCEDURE — 99214 OFFICE O/P EST MOD 30 MIN: CPT | Performed by: NURSE PRACTITIONER

## 2022-12-23 RX ORDER — SUCRALFATE ORAL 1 G/10ML
1 SUSPENSION ORAL 4 TIMES DAILY
Qty: 420 ML | Refills: 3 | Status: SHIPPED | OUTPATIENT
Start: 2022-12-23

## 2022-12-23 NOTE — PATIENT INSTRUCTIONS
Continue Nexium and I have refilled carafate as needed with meals and at bedtime.  Okay to use TUMs and Pepcid as needed for breakthrough symptoms.  Get H. Pylori stool testing ordered.  Please complete.  Make appointment with GI for evaluation or treatment.  Most likely you had an upper respiratory.  If symptoms do not continue to improve or worsen again follow-up in clinic.

## 2022-12-23 NOTE — PROGRESS NOTES
Assessment & Plan     Gastroesophageal reflux disease, unspecified whether esophagitis present  Recommending continued daily Nexium due to symptoms of chronic GERD returning off medication and hx of hiatal hernia on EGD.  Refilled carafate for as needed use.  Discussed that TUMs and Pepcid are okay to use. Patient has IBS and has constipation which may be cause of early satiety or feeling like his stomach is full.  I have ordered H. Pylori stool testing to rule this out, discussed increasing fluids with daily fiber supplement and using stool softeners/Miralax as needed and referred to GI if symptoms are not improving for further evaluation and treatment.  - Helicobacter pylori Antigen Stool; Future  - sucralfate (CARAFATE) 1 GM/10ML suspension; Take 10 mLs (1 g) by mouth 4 times daily    Viral Infection  Symptoms are improving over the last 24 hours significantly.  Recommend continued conservative treatment for symptoms until resolved with follow-up if recurring or worsening over the next week.    See Patient Instructions    Return in about 1 week (around 12/30/2022), or if symptoms worsen or fail to improve.    Mai Huerta NP  [facility]    Latoya Cheng is a 42 year old, presenting for the following health issues:  Sinus Problem and Gastrophageal Reflux      HPI     Acute Illness  Acute illness concerns: cough,head congestion  Onset/Duration: saturday  Symptoms:  Fever: YES  Chills/Sweats: YES  Headache (location?): No  Sinus Pressure: YES  Conjunctivitis:  No  Ear Pain: YES: bilateral  Rhinorrhea: YES  Congestion: YES  Sore Throat: No  Cough: YES-productive of yellow sputum  Wheeze: No  Decreased Appetite: YES  Nausea: No  Vomiting: No  Diarrhea: No  Dysuria/Freq.: No  Dysuria or Hematuria: No  Fatigue/Achiness: YES  Sick/Strep Exposure: No  Therapies tried and outcome: nyquil    Symptoms have improved a lot since yesterday.  Would like ears checked even though improved since  they have still been mildly bothersome.    GERD/Heartburn  Onset/Duration: many years  Description: gastric reflux  Intensity: moderate, severe  Progression of Symptoms: worsening  Accompanying Signs & Symptoms:  Does it feel like food gets stuck or trouble swallowing: YES  Nausea: No  Vomiting (bloody?): No  Abdominal Pain: YES  Black-Tarry stools: No  Bloody stools: No  History:  Previous similar episodes: YES  Previous ulcers: No  Precipitating factors:   Caffeine use: YES  Alcohol use: No  NSAID/Aspirin use: No  Tobacco use: No  Worse with fatty foods and spicy foods.  Alleviating factors: mastic gum  Therapies tried and outcome:             Lifestyle changes: eating more bland foods,not getting enough fruits do to acid            Medications: Omeprazole (Prilosec), Nexium, Protonix, Zantac (Ranitidine), Pepcid (famotidine), antacids and medication not helpful    Nexium daily but does not like to take this medication daily due to kidney issues that can occur on chronic use.  Has been taking Nexium on and off but daily now for the last 4 months.  Has times where he feels that his food is hard to choke down but this is rare.  EGD completed in 2021 and biopsies showed chronic inflammatory process and no h. Pylori or cancer changes.  Has been trying to eat good.  Soda pop helps to digest food itself.  Denies early satiety but feels that his stomach feels full at times and he does not have an appetite.  He has anxiety and this contributes to IBS.  He feels constipation can be issue at times.  He is not drinking a lot of fluids daily but takes fiber supplement gummies.  He will occasionally use stool softeners and recently tried Miralax.  He denies dysphagia saying this is rare but feels like his stomach is swollen.    Review of Systems   CONSTITUTIONAL: NEGATIVE for fever, chills, change in weight  ENT/MOUTH: POSITIVE for ear pain bilateral and nasal congestion that is draining to the back of the throat  RESP:  POSITIVE for cough that is occasionally productive, but more nasal drainage per patient.  CV: NEGATIVE for chest pain, palpitations or peripheral edema  GI: POSITIVE for constipation, diarrhea, dyspepsia, heartburn or reflux, Hx GERD, Hx IBS and poor appetite  PSYCHIATRIC: POSITIVE for anxiety  ROS otherwise negative      Objective    /76   Pulse 98   Temp 97.5  F (36.4  C) (Tympanic)   Resp 16   Wt 87.2 kg (192 lb 3.2 oz)   SpO2 97%   BMI 25.53 kg/m    Body mass index is 25.53 kg/m .  Physical Exam   GENERAL: healthy, alert and no distress  HENT: normal cephalic/atraumatic, right ear: mildly erythematous from rinsing at home otherwise normal, left ear: normal: no effusions, no erythema, normal landmarks, nose and mouth without ulcers or lesions, oropharynx clear and oral mucous membranes moist  RESP: lungs clear to auscultation - no rales, rhonchi or wheezes  CV: regular rate and rhythm, normal S1 S2, no S3 or S4, no murmur, click or rub, no peripheral edema and peripheral pulses strong  ABDOMEN: bowel sounds normal and hard to assess abdomen with palpation since patient tightened up the abdominal muscle during the exam and was not relaxed  PSYCH: mentation appears normal, affect flat and anxious

## 2023-01-05 PROCEDURE — 87338 HPYLORI STOOL AG IA: CPT

## 2023-01-06 LAB — H PYLORI AG STL QL IA: NEGATIVE

## 2023-03-26 ENCOUNTER — HEALTH MAINTENANCE LETTER (OUTPATIENT)
Age: 43
End: 2023-03-26

## 2024-06-01 ENCOUNTER — HEALTH MAINTENANCE LETTER (OUTPATIENT)
Age: 44
End: 2024-06-01

## 2025-06-14 ENCOUNTER — HEALTH MAINTENANCE LETTER (OUTPATIENT)
Age: 45
End: 2025-06-14

## (undated) RX ORDER — PROPOFOL 10 MG/ML
INJECTION, EMULSION INTRAVENOUS
Status: DISPENSED
Start: 2021-02-16

## (undated) RX ORDER — GLYCOPYRROLATE 0.2 MG/ML
INJECTION, SOLUTION INTRAMUSCULAR; INTRAVENOUS
Status: DISPENSED
Start: 2021-02-16

## (undated) RX ORDER — LIDOCAINE HYDROCHLORIDE 10 MG/ML
INJECTION, SOLUTION EPIDURAL; INFILTRATION; INTRACAUDAL; PERINEURAL
Status: DISPENSED
Start: 2021-02-16